# Patient Record
Sex: FEMALE | Race: BLACK OR AFRICAN AMERICAN | NOT HISPANIC OR LATINO | URBAN - METROPOLITAN AREA
[De-identification: names, ages, dates, MRNs, and addresses within clinical notes are randomized per-mention and may not be internally consistent; named-entity substitution may affect disease eponyms.]

---

## 2017-04-07 NOTE — PATIENT DISCUSSION
(Q68.2089) Type 2 diab with mild nonp rtnop without mclr edema l eye - Assesment : Patient has Type 2 Diabetes with mild NPDR Patient states A1C and BS is under control - Plan : Continue to follow with PCP and monitor BS

## 2017-04-07 NOTE — PATIENT DISCUSSION
(E11.9) Type 2 diabetes mellitus without complications - Assesment : Patient has type II diabetes with no ocular complications. - Plan : Continue care with PCP. Will send letter to PCP with today's findings. Discussed with patient Blood Sugar control and A1C under 6. 5.

## 2017-04-07 NOTE — PATIENT DISCUSSION
(E00.391) Dermatochalasis of right upper eyelid - Assesment : Examination revealed Dermatochalasis - Plan : Monitor for changes. Advised patient to call our office with decreased vision or increased symptoms.

## 2017-04-07 NOTE — PATIENT DISCUSSION
(Q85.3415) Nexdtve age-related mclr degn bilateral intermed dry stage - Assesment : Examination revealed AMD Dry. - Plan : Monitor for changes. Advised patient to call our office with decreased vision or increased distortion.

## 2017-04-07 NOTE — PATIENT DISCUSSION
(A80.129) Dermatochalasis of left upper eyelid - Assesment : Examination revealed Dermatochalasis - Plan : Monitor for changes. Advised patient to call our office with decreased vision or increased symptoms.

## 2018-05-10 NOTE — PATIENT DISCUSSION
(O82.2587) Nexdtve age-related mclr degn bilateral intermed dry stage - Assesment : Examination revealed intermediate AMD Dry. - Plan : Patient advised to check [employer] regularly (once weekly or more) and use nutraceuticals such as AREDS 2 eye vitamins. Wear sunglasses when outdoors and eat green, leafy vegetables to maintain ocular health.

## 2018-05-10 NOTE — PATIENT DISCUSSION
(H33.6738) Type 2 diab with mild nonp rtnop without mclr edema l eye - Assesment : Patient has Type 2 Diabetes with mild NPDR Patient states A1C and BS is under control - Plan : Continue to follow with PCP and monitor BS

## 2018-05-11 NOTE — PATIENT DISCUSSION
(H10.45) Other chronic allergic conjunctivitis - Assesment : Examination revealed Allergic Conjunctivitis. - Plan : Monitor for changes. Advised patient to call our office with decreased vision or an increase in symptoms.    Start Zylet TID OU for 3 days Rtc as scheduled

## 2018-11-15 NOTE — PATIENT DISCUSSION
(G10.3573) Nexdtve age-related mclr degn bilateral intermed dry stage - Assesment : Examination revealed intermediate AMD Dry. - Plan : Patient advised to check [employer] regularly (once weekly or more) and use nutraceuticals such as AREDS 2 eye vitamins. Wear sunglasses when outdoors and eat green, leafy vegetables to maintain ocular health.  6 months Exam-octm

## 2018-11-15 NOTE — PATIENT DISCUSSION
(R00.6123) Type 2 diab with mild nonp rtnop without mclr edema l eye - Assesment : Patient has Type 2 Diabetes with mild NPDR,no edema seen today.  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

## 2019-07-02 NOTE — PATIENT DISCUSSION
(F96.0573) Type 2 diab with mild nonp rtnop without macular edema bi - Assesment : A1C WAS 7.2 PER PT DOT BLOT HEM OS - Plan : OBSERVE

## 2019-07-02 NOTE — PATIENT DISCUSSION
(A39.7945) Nexdtve age-related mclr degn bilateral intermed dry stage - Assesment : Examination revealed intermediate AMD Dry.- INTERMEDIATE - Plan : Patient advised to check [address] regularly (once weekly or more) and use nutraceuticals such as AREDS 2 eye vitamins.  Wear sunglasses when outdoors and eat green, leafy vegetables to maintain ocular health. 6 MONTH/ DILATION Daravelino Dena PHOTOS

## 2022-02-21 ENCOUNTER — NEW PATIENT (OUTPATIENT)
Dept: URBAN - METROPOLITAN AREA CLINIC 1 | Facility: CLINIC | Age: 67
End: 2022-02-21

## 2022-02-21 DIAGNOSIS — H25.813: ICD-10-CM

## 2022-02-21 DIAGNOSIS — H04.123: ICD-10-CM

## 2022-02-21 DIAGNOSIS — H01.024: ICD-10-CM

## 2022-02-21 DIAGNOSIS — H01.021: ICD-10-CM

## 2022-02-21 PROCEDURE — 92004 COMPRE OPH EXAM NEW PT 1/>: CPT

## 2022-02-21 PROCEDURE — 92015 DETERMINE REFRACTIVE STATE: CPT

## 2022-02-21 ASSESSMENT — VISUAL ACUITY
OS_CC: 20/25
OD_CC: 20/40
OD_PAM: 20/40
OS_CC: J6
OD_CC: J6
OS_AM: 20/30

## 2024-03-14 ENCOUNTER — APPOINTMENT (OUTPATIENT)
Facility: HOSPITAL | Age: 69
End: 2024-03-14
Payer: MEDICARE

## 2024-03-14 ENCOUNTER — APPOINTMENT (OUTPATIENT)
Facility: HOSPITAL | Age: 69
End: 2024-03-14
Attending: STUDENT IN AN ORGANIZED HEALTH CARE EDUCATION/TRAINING PROGRAM
Payer: MEDICARE

## 2024-03-14 ENCOUNTER — HOSPITAL ENCOUNTER (EMERGENCY)
Facility: HOSPITAL | Age: 69
Discharge: HOME | End: 2024-03-14
Attending: STUDENT IN AN ORGANIZED HEALTH CARE EDUCATION/TRAINING PROGRAM
Payer: MEDICARE

## 2024-03-14 VITALS
BODY MASS INDEX: 39.99 KG/M2 | OXYGEN SATURATION: 99 % | HEART RATE: 56 BPM | RESPIRATION RATE: 18 BRPM | WEIGHT: 270 LBS | HEIGHT: 69 IN | SYSTOLIC BLOOD PRESSURE: 169 MMHG | TEMPERATURE: 98.2 F | DIASTOLIC BLOOD PRESSURE: 77 MMHG

## 2024-03-14 DIAGNOSIS — L03.115 CELLULITIS OF RIGHT LOWER EXTREMITY: ICD-10-CM

## 2024-03-14 DIAGNOSIS — R60.0 LEG EDEMA: Primary | ICD-10-CM

## 2024-03-14 LAB
ALBUMIN SERPL-MCNC: 3.5 G/DL (ref 3.4–5)
ALBUMIN/GLOB SERPL: 0.7 (ref 0.8–1.7)
ALP SERPL-CCNC: 101 U/L (ref 45–117)
ALT SERPL-CCNC: 25 U/L (ref 13–56)
ANION GAP SERPL CALC-SCNC: 6 MMOL/L (ref 3–18)
AST SERPL-CCNC: 21 U/L (ref 10–38)
BASOPHILS # BLD: 0.1 K/UL (ref 0–0.1)
BASOPHILS NFR BLD: 1 % (ref 0–2)
BILIRUB SERPL-MCNC: 0.6 MG/DL (ref 0.2–1)
BUN SERPL-MCNC: 20 MG/DL (ref 7–18)
BUN/CREAT SERPL: 18 (ref 12–20)
CALCIUM SERPL-MCNC: 9.3 MG/DL (ref 8.5–10.1)
CHLORIDE SERPL-SCNC: 107 MMOL/L (ref 100–111)
CO2 SERPL-SCNC: 28 MMOL/L (ref 21–32)
CREAT SERPL-MCNC: 1.11 MG/DL (ref 0.6–1.3)
D DIMER PPP FEU-MCNC: 1.24 UG/ML(FEU)
DIFFERENTIAL METHOD BLD: ABNORMAL
EOSINOPHIL # BLD: 0.5 K/UL (ref 0–0.4)
EOSINOPHIL NFR BLD: 9 % (ref 0–5)
ERYTHROCYTE [DISTWIDTH] IN BLOOD BY AUTOMATED COUNT: 14.4 % (ref 11.6–14.5)
GLOBULIN SER CALC-MCNC: 4.8 G/DL (ref 2–4)
GLUCOSE SERPL-MCNC: 108 MG/DL (ref 74–99)
HCT VFR BLD AUTO: 38.2 % (ref 35–45)
HGB BLD-MCNC: 12.2 G/DL (ref 12–16)
IMM GRANULOCYTES # BLD AUTO: 0 K/UL (ref 0–0.04)
IMM GRANULOCYTES NFR BLD AUTO: 0 % (ref 0–0.5)
INR PPP: 1.1 (ref 0.9–1.1)
LYMPHOCYTES # BLD: 2 K/UL (ref 0.9–3.6)
LYMPHOCYTES NFR BLD: 34 % (ref 21–52)
MAGNESIUM SERPL-MCNC: 1.9 MG/DL (ref 1.6–2.6)
MCH RBC QN AUTO: 29 PG (ref 24–34)
MCHC RBC AUTO-ENTMCNC: 31.9 G/DL (ref 31–37)
MCV RBC AUTO: 91 FL (ref 78–100)
MONOCYTES # BLD: 0.6 K/UL (ref 0.05–1.2)
MONOCYTES NFR BLD: 11 % (ref 3–10)
NEUTS SEG # BLD: 2.7 K/UL (ref 1.8–8)
NEUTS SEG NFR BLD: 45 % (ref 40–73)
NRBC # BLD: 0 K/UL (ref 0–0.01)
NRBC BLD-RTO: 0 PER 100 WBC
NT PRO BNP: 136 PG/ML (ref 0–900)
PLATELET # BLD AUTO: 256 K/UL (ref 135–420)
PMV BLD AUTO: 9.9 FL (ref 9.2–11.8)
POTASSIUM SERPL-SCNC: 3.8 MMOL/L (ref 3.5–5.5)
PROT SERPL-MCNC: 8.3 G/DL (ref 6.4–8.2)
PROTHROMBIN TIME: 14 SEC (ref 11.9–14.7)
RBC # BLD AUTO: 4.2 M/UL (ref 4.2–5.3)
SODIUM SERPL-SCNC: 141 MMOL/L (ref 136–145)
TROPONIN I SERPL HS-MCNC: 8 NG/L (ref 0–54)
WBC # BLD AUTO: 6 K/UL (ref 4.6–13.2)

## 2024-03-14 PROCEDURE — 71045 X-RAY EXAM CHEST 1 VIEW: CPT

## 2024-03-14 PROCEDURE — 85379 FIBRIN DEGRADATION QUANT: CPT

## 2024-03-14 PROCEDURE — 85610 PROTHROMBIN TIME: CPT

## 2024-03-14 PROCEDURE — 93005 ELECTROCARDIOGRAM TRACING: CPT | Performed by: STUDENT IN AN ORGANIZED HEALTH CARE EDUCATION/TRAINING PROGRAM

## 2024-03-14 PROCEDURE — 80053 COMPREHEN METABOLIC PANEL: CPT

## 2024-03-14 PROCEDURE — 85025 COMPLETE CBC W/AUTO DIFF WBC: CPT

## 2024-03-14 PROCEDURE — 6370000000 HC RX 637 (ALT 250 FOR IP): Performed by: STUDENT IN AN ORGANIZED HEALTH CARE EDUCATION/TRAINING PROGRAM

## 2024-03-14 PROCEDURE — 99285 EMERGENCY DEPT VISIT HI MDM: CPT

## 2024-03-14 PROCEDURE — 83735 ASSAY OF MAGNESIUM: CPT

## 2024-03-14 PROCEDURE — 83880 ASSAY OF NATRIURETIC PEPTIDE: CPT

## 2024-03-14 PROCEDURE — 93971 EXTREMITY STUDY: CPT

## 2024-03-14 PROCEDURE — 84484 ASSAY OF TROPONIN QUANT: CPT

## 2024-03-14 RX ORDER — CEPHALEXIN 500 MG/1
500 CAPSULE ORAL 4 TIMES DAILY
Qty: 28 CAPSULE | Refills: 0 | Status: SHIPPED | OUTPATIENT
Start: 2024-03-14 | End: 2024-03-21

## 2024-03-14 RX ORDER — CEPHALEXIN 500 MG/1
500 CAPSULE ORAL 4 TIMES DAILY
Qty: 28 CAPSULE | Refills: 0 | Status: SHIPPED | OUTPATIENT
Start: 2024-03-14 | End: 2024-03-14

## 2024-03-14 RX ORDER — CEPHALEXIN 250 MG/1
500 CAPSULE ORAL
Status: COMPLETED | OUTPATIENT
Start: 2024-03-14 | End: 2024-03-14

## 2024-03-14 RX ADMIN — CEPHALEXIN 500 MG: 250 CAPSULE ORAL at 19:13

## 2024-03-14 ASSESSMENT — PAIN - FUNCTIONAL ASSESSMENT: PAIN_FUNCTIONAL_ASSESSMENT: NONE - DENIES PAIN

## 2024-03-14 NOTE — DISCHARGE INSTRUCTIONS
Please give Keflex medication 4 times daily for the next 7 days to treat the possible cellulitis of her right lower extremity.

## 2024-03-14 NOTE — ED PROVIDER NOTES
[facility] EMERGENCY DEPT  EMERGENCY DEPARTMENT ENCOUNTER      Pt Name: Toya Durand  MRN: [id number]  Birthdate 1955  Date of evaluation: 3/14/2024  Provider: Sheridan Porter MD    CHIEF COMPLAINT       Chief Complaint   Patient presents with    Leg Swelling         HISTORY OF PRESENT ILLNESS   (Location/Symptom, Timing/Onset, Context/Setting, Quality, Duration, Modifying Factors, Severity)  Note limiting factors.   Toya Durand is a 69 y.o. female who presents to the emergency department with her power of  for concerns for worsening edema to her lower extremities worse on the right compared to the left.  Denies any known history of blood clots.  States she does sit with her legs down throughout the day.  Patient is advanced dementia and is a poor historian but denies any current chest pain, shortness of breath or pain in her lower extremities.  Caregiver states that she has recently been complaining of pain in her legs and is painful to touch.  Is not currently on any blood thinners or water pills.     Nursing Notes were reviewed.    REVIEW OF SYSTEMS    (2-9 systems for level 4, 10 or more for level 5)     Respiratory: No SOB  Cardio: No chest pain  GI: No blood in stool  : No hematuria  Neuro: No headache    Except as noted above the remainder of the review of systems was reviewed and negative.       PAST MEDICAL HISTORY   No past medical history on file.      SURGICAL HISTORY     No past surgical history on file.      CURRENT MEDICATIONS       Previous Medications    No medications on file       ALLERGIES     Patient has no known allergies.    FAMILY HISTORY     No family history on file.       SOCIAL HISTORY       Social History     Socioeconomic History    Marital status: Single       SCREENINGS         [place] Coma Scale  Eye Opening: Spontaneous  Best Verbal Response: Oriented  Best Motor Response: Obeys commands  [place] Coma Scale Score: 15                     CIWA Assessment  BP: (!)

## 2024-03-14 NOTE — ED TRIAGE NOTES
Ambulatory to 4 with c/o RLE edema and pain x4 days. Brought by POA from assisted living facility. Pt with Hx of Dementia and poor Historian but is alert to person, denies CP or SOB and caregiver denies c/o either at home.

## 2024-03-15 LAB
EKG ATRIAL RATE: 59 BPM
EKG DIAGNOSIS: NORMAL
EKG P AXIS: 48 DEGREES
EKG P-R INTERVAL: 192 MS
EKG Q-T INTERVAL: 432 MS
EKG QRS DURATION: 90 MS
EKG QTC CALCULATION (BAZETT): 427 MS
EKG R AXIS: 6 DEGREES
EKG T AXIS: 27 DEGREES
EKG VENTRICULAR RATE: 59 BPM

## 2024-03-15 PROCEDURE — 93010 ELECTROCARDIOGRAM REPORT: CPT | Performed by: INTERNAL MEDICINE

## 2024-03-17 LAB — ECHO BSA: 2.44 M2

## 2024-11-12 ENCOUNTER — APPOINTMENT (OUTPATIENT)
Facility: HOSPITAL | Age: 69
End: 2024-11-12
Payer: MEDICARE

## 2024-11-12 ENCOUNTER — APPOINTMENT (OUTPATIENT)
Facility: HOSPITAL | Age: 69
End: 2024-11-12
Attending: EMERGENCY MEDICINE
Payer: MEDICARE

## 2024-11-12 ENCOUNTER — HOSPITAL ENCOUNTER (INPATIENT)
Facility: HOSPITAL | Age: 69
LOS: 3 days | Discharge: NURSING, REHAB OR LONG-TERM CARE | End: 2024-11-15
Attending: EMERGENCY MEDICINE | Admitting: STUDENT IN AN ORGANIZED HEALTH CARE EDUCATION/TRAINING PROGRAM
Payer: MEDICARE

## 2024-11-12 DIAGNOSIS — R00.1 BRADYCARDIA: ICD-10-CM

## 2024-11-12 DIAGNOSIS — R60.0 BILATERAL LOWER EXTREMITY EDEMA: ICD-10-CM

## 2024-11-12 DIAGNOSIS — I50.31 ACUTE DIASTOLIC CONGESTIVE HEART FAILURE (HCC): ICD-10-CM

## 2024-11-12 DIAGNOSIS — F03.A0 MILD DEMENTIA WITHOUT BEHAVIORAL DISTURBANCE, PSYCHOTIC DISTURBANCE, MOOD DISTURBANCE, OR ANXIETY, UNSPECIFIED DEMENTIA TYPE (HCC): Primary | ICD-10-CM

## 2024-11-12 PROBLEM — R41.82 ALTERED MENTAL STATUS: Status: ACTIVE | Noted: 2024-11-12

## 2024-11-12 LAB
ALBUMIN SERPL-MCNC: 3.6 G/DL (ref 3.4–5)
ALBUMIN/GLOB SERPL: 0.8 (ref 0.8–1.7)
ALP SERPL-CCNC: 87 U/L (ref 45–117)
ALT SERPL-CCNC: 40 U/L (ref 13–56)
ANION GAP SERPL CALC-SCNC: 7 MMOL/L (ref 3–18)
AST SERPL-CCNC: 107 U/L (ref 10–38)
BASE EXCESS BLDV CALC-SCNC: 4.5 MMOL/L
BASOPHILS # BLD: 0.1 K/UL (ref 0–0.1)
BASOPHILS NFR BLD: 1 % (ref 0–2)
BILIRUB SERPL-MCNC: 0.8 MG/DL (ref 0.2–1)
BUN SERPL-MCNC: 32 MG/DL (ref 7–18)
BUN/CREAT SERPL: 25 (ref 12–20)
CALCIUM SERPL-MCNC: 9.2 MG/DL (ref 8.5–10.1)
CHLORIDE SERPL-SCNC: 104 MMOL/L (ref 100–111)
CO2 SERPL-SCNC: 30 MMOL/L (ref 21–32)
CREAT SERPL-MCNC: 1.28 MG/DL (ref 0.6–1.3)
DIFFERENTIAL METHOD BLD: ABNORMAL
EOSINOPHIL # BLD: 0.3 K/UL (ref 0–0.4)
EOSINOPHIL NFR BLD: 5 % (ref 0–5)
ERYTHROCYTE [DISTWIDTH] IN BLOOD BY AUTOMATED COUNT: 14.2 % (ref 11.6–14.5)
GLOBULIN SER CALC-MCNC: 4.6 G/DL (ref 2–4)
GLUCOSE SERPL-MCNC: 87 MG/DL (ref 74–99)
HCO3 BLDV-SCNC: 31.7 MMOL/L (ref 23–28)
HCT VFR BLD AUTO: 37.2 % (ref 35–45)
HGB BLD-MCNC: 11.8 G/DL (ref 12–16)
IMM GRANULOCYTES # BLD AUTO: 0 K/UL (ref 0–0.04)
IMM GRANULOCYTES NFR BLD AUTO: 0 % (ref 0–0.5)
LYMPHOCYTES # BLD: 1.7 K/UL (ref 0.9–3.6)
LYMPHOCYTES NFR BLD: 28 % (ref 21–52)
MAGNESIUM SERPL-MCNC: 2.1 MG/DL (ref 1.6–2.6)
MCH RBC QN AUTO: 29.6 PG (ref 24–34)
MCHC RBC AUTO-ENTMCNC: 31.7 G/DL (ref 31–37)
MCV RBC AUTO: 93.2 FL (ref 78–100)
MONOCYTES # BLD: 0.6 K/UL (ref 0.05–1.2)
MONOCYTES NFR BLD: 9 % (ref 3–10)
NEUTS SEG # BLD: 3.4 K/UL (ref 1.8–8)
NEUTS SEG NFR BLD: 56 % (ref 40–73)
NRBC # BLD: 0 K/UL (ref 0–0.01)
NRBC BLD-RTO: 0 PER 100 WBC
NT PRO BNP: 536 PG/ML (ref 0–900)
PCO2 BLDV: 56.2 MMHG (ref 41–51)
PH BLDV: 7.36 (ref 7.32–7.42)
PLATELET # BLD AUTO: 200 K/UL (ref 135–420)
PMV BLD AUTO: 9.7 FL (ref 9.2–11.8)
PO2 BLDV: 25 MMHG (ref 25–40)
POTASSIUM SERPL-SCNC: 3 MMOL/L (ref 3.5–5.5)
PROT SERPL-MCNC: 8.2 G/DL (ref 6.4–8.2)
RBC # BLD AUTO: 3.99 M/UL (ref 4.2–5.3)
SAO2 % BLDV: 41.4 % (ref 65–88)
SERVICE CMNT-IMP: ABNORMAL
SODIUM SERPL-SCNC: 141 MMOL/L (ref 136–145)
SPECIMEN TYPE: ABNORMAL
TROPONIN I SERPL HS-MCNC: 21 NG/L (ref 0–54)
TSH SERPL DL<=0.05 MIU/L-ACNC: 0.82 UIU/ML (ref 0.36–3.74)
WBC # BLD AUTO: 6.1 K/UL (ref 4.6–13.2)

## 2024-11-12 PROCEDURE — 1100000000 HC RM PRIVATE

## 2024-11-12 PROCEDURE — 83735 ASSAY OF MAGNESIUM: CPT

## 2024-11-12 PROCEDURE — 85025 COMPLETE CBC W/AUTO DIFF WBC: CPT

## 2024-11-12 PROCEDURE — 93005 ELECTROCARDIOGRAM TRACING: CPT | Performed by: EMERGENCY MEDICINE

## 2024-11-12 PROCEDURE — 84443 ASSAY THYROID STIM HORMONE: CPT

## 2024-11-12 PROCEDURE — 80053 COMPREHEN METABOLIC PANEL: CPT

## 2024-11-12 PROCEDURE — 82140 ASSAY OF AMMONIA: CPT

## 2024-11-12 PROCEDURE — 83880 ASSAY OF NATRIURETIC PEPTIDE: CPT

## 2024-11-12 PROCEDURE — 84484 ASSAY OF TROPONIN QUANT: CPT

## 2024-11-12 PROCEDURE — 6370000000 HC RX 637 (ALT 250 FOR IP): Performed by: EMERGENCY MEDICINE

## 2024-11-12 PROCEDURE — 70450 CT HEAD/BRAIN W/O DYE: CPT

## 2024-11-12 PROCEDURE — 71045 X-RAY EXAM CHEST 1 VIEW: CPT

## 2024-11-12 PROCEDURE — 82803 BLOOD GASES ANY COMBINATION: CPT

## 2024-11-12 PROCEDURE — 99285 EMERGENCY DEPT VISIT HI MDM: CPT

## 2024-11-12 RX ORDER — DONEPEZIL HYDROCHLORIDE 10 MG/1
10 TABLET, FILM COATED ORAL NIGHTLY
COMMUNITY

## 2024-11-12 RX ORDER — GABAPENTIN 100 MG/1
100 CAPSULE ORAL 2 TIMES DAILY
COMMUNITY
End: 2024-11-18

## 2024-11-12 RX ORDER — MEMANTINE HYDROCHLORIDE 10 MG/1
10 TABLET ORAL 2 TIMES DAILY
COMMUNITY

## 2024-11-12 RX ORDER — ATENOLOL 50 MG/1
50 TABLET ORAL DAILY
COMMUNITY

## 2024-11-12 RX ORDER — FLUTICASONE PROPIONATE AND SALMETEROL XINAFOATE 45; 21 UG/1; UG/1
2 AEROSOL, METERED RESPIRATORY (INHALATION) 2 TIMES DAILY
COMMUNITY

## 2024-11-12 RX ORDER — POTASSIUM CHLORIDE 1.5 G/1.58G
20 POWDER, FOR SOLUTION ORAL DAILY
COMMUNITY

## 2024-11-12 RX ORDER — HYDROCHLOROTHIAZIDE 25 MG/1
25 TABLET ORAL DAILY
COMMUNITY

## 2024-11-12 RX ORDER — QUETIAPINE FUMARATE 50 MG/1
50 TABLET, EXTENDED RELEASE ORAL EVERY OTHER DAY
COMMUNITY

## 2024-11-12 RX ORDER — PYRIDOXINE HCL (VITAMIN B6) 100 MG
500 TABLET ORAL 2 TIMES DAILY
COMMUNITY

## 2024-11-12 RX ORDER — ATORVASTATIN CALCIUM 10 MG/1
10 TABLET, FILM COATED ORAL DAILY
COMMUNITY

## 2024-11-12 RX ORDER — LOSARTAN POTASSIUM 100 MG/1
100 TABLET ORAL DAILY
COMMUNITY

## 2024-11-12 RX ADMIN — POTASSIUM BICARBONATE 40 MEQ: 782 TABLET, EFFERVESCENT ORAL at 21:59

## 2024-11-12 ASSESSMENT — PAIN - FUNCTIONAL ASSESSMENT: PAIN_FUNCTIONAL_ASSESSMENT: NONE - DENIES PAIN

## 2024-11-12 ASSESSMENT — LIFESTYLE VARIABLES
HOW OFTEN DO YOU HAVE A DRINK CONTAINING ALCOHOL: NEVER
HOW MANY STANDARD DRINKS CONTAINING ALCOHOL DO YOU HAVE ON A TYPICAL DAY: PATIENT DOES NOT DRINK

## 2024-11-12 NOTE — ED PROVIDER NOTES
[facility] EMERGENCY DEPT  EMERGENCY DEPARTMENT ENCOUNTER    Patient Name: Toya Durand  MRN: [id number]  YOB: 1955  Provider: Tres Junior DO  PCP: Estephania Greene MD   Time/Date of evaluation: 6:24 PM EST on 11/12/24    History of Presenting Illness     History Provided by: Patient, Caretaker  History is limited by: Dementia     HISTORY:   Toya Durand is a 69 y.o. female brought in by her daughter who is power of  with a chief complaint of increasing fatigue, and lower extremity edema.  Patient has a history of chronic venous stasis dermatitis, hypertension, UTI, dementia, organic psychosis, and hyperlipidemia.  Daughter states that the nursing facility at [facility] reports that the patient has had increasing fatigue and sleeping more over the past few days.  Patient's daughter states that they decreased her Seroquel dose last week because she was sleeping too much.  Patient completed a antibiotic course of cephalexin 2 days ago which was prescribed for a urinary tract infection.  She states that the patient has had decreased appetite but ate a meal yesterday which was her favorite food Popeyes chicken.  Nursing Notes were all reviewed and agreed with or any disagreements were addressed in the HPI.    Past History     PAST MEDICAL HISTORY:  No past medical history on file.    PAST SURGICAL HISTORY:  No past surgical history on file.    FAMILY HISTORY:  No family history on file.    SOCIAL HISTORY:       MEDICATIONS:  No current facility-administered medications for this encounter.     Current Outpatient Medications   Medication Sig Dispense Refill    fluticasone-salmeterol (ADVAIR HFA) 45-21 MCG/ACT inhaler Inhale 2 puffs into the lungs 2 times daily      atenolol (TENORMIN) 50 MG tablet Take 1 tablet by mouth daily      atorvastatin (LIPITOR) 10 MG tablet Take 1 tablet by mouth daily      Cranberry 500 MG CAPS Take 1 capsule by mouth 2 times daily      donepezil (ARICEPT) 10 MG

## 2024-11-12 NOTE — ED TRIAGE NOTES
Pt recently finished antibiotics for UTI. She has been fatigued and not acting as energetic as normal the last 2 days. Her Seroquel was recently decreased to half a piull due to sleeping more. She has had decreased appetite 2 days. Family also reports increased RLE edema which she gets chronically and is on Lasix PRN for. She started it again yesterday with some improvement.

## 2024-11-13 ENCOUNTER — APPOINTMENT (OUTPATIENT)
Facility: HOSPITAL | Age: 69
End: 2024-11-13
Payer: MEDICARE

## 2024-11-13 PROBLEM — I10 HYPERTENSION: Status: ACTIVE | Noted: 2024-11-13

## 2024-11-13 PROBLEM — F03.90 DEMENTIA (HCC): Status: ACTIVE | Noted: 2024-11-13

## 2024-11-13 PROBLEM — E87.6 HYPOKALEMIA: Status: ACTIVE | Noted: 2024-11-13

## 2024-11-13 LAB
AMMONIA PLAS-SCNC: 27 UMOL/L (ref 11–32)
APPEARANCE UR: CLEAR
BILIRUB UR QL: NEGATIVE
COLOR UR: YELLOW
EKG DIAGNOSIS: NORMAL
EKG Q-T INTERVAL: 468 MS
EKG QRS DURATION: 76 MS
EKG QTC CALCULATION (BAZETT): 409 MS
EKG R AXIS: 12 DEGREES
EKG T AXIS: 22 DEGREES
EKG VENTRICULAR RATE: 46 BPM
GLUCOSE UR STRIP.AUTO-MCNC: NEGATIVE MG/DL
HGB UR QL STRIP: NEGATIVE
KETONES UR QL STRIP.AUTO: NEGATIVE MG/DL
LEUKOCYTE ESTERASE UR QL STRIP.AUTO: NEGATIVE
NITRITE UR QL STRIP.AUTO: NEGATIVE
PH UR STRIP: 5.5 (ref 5–8)
PROT UR STRIP-MCNC: NEGATIVE MG/DL
SP GR UR REFRACTOMETRY: 1.02 (ref 1–1.03)
UROBILINOGEN UR QL STRIP.AUTO: 1 EU/DL (ref 0.2–1)

## 2024-11-13 PROCEDURE — 2580000003 HC RX 258: Performed by: STUDENT IN AN ORGANIZED HEALTH CARE EDUCATION/TRAINING PROGRAM

## 2024-11-13 PROCEDURE — 6360000002 HC RX W HCPCS: Performed by: STUDENT IN AN ORGANIZED HEALTH CARE EDUCATION/TRAINING PROGRAM

## 2024-11-13 PROCEDURE — 51701 INSERT BLADDER CATHETER: CPT

## 2024-11-13 PROCEDURE — 1100000003 HC PRIVATE W/ TELEMETRY

## 2024-11-13 PROCEDURE — 87086 URINE CULTURE/COLONY COUNT: CPT

## 2024-11-13 PROCEDURE — 94640 AIRWAY INHALATION TREATMENT: CPT

## 2024-11-13 PROCEDURE — 6370000000 HC RX 637 (ALT 250 FOR IP): Performed by: STUDENT IN AN ORGANIZED HEALTH CARE EDUCATION/TRAINING PROGRAM

## 2024-11-13 PROCEDURE — 70551 MRI BRAIN STEM W/O DYE: CPT

## 2024-11-13 PROCEDURE — 81003 URINALYSIS AUTO W/O SCOPE: CPT

## 2024-11-13 PROCEDURE — 93010 ELECTROCARDIOGRAM REPORT: CPT | Performed by: INTERNAL MEDICINE

## 2024-11-13 PROCEDURE — 99223 1ST HOSP IP/OBS HIGH 75: CPT | Performed by: STUDENT IN AN ORGANIZED HEALTH CARE EDUCATION/TRAINING PROGRAM

## 2024-11-13 PROCEDURE — 51798 US URINE CAPACITY MEASURE: CPT

## 2024-11-13 PROCEDURE — 94761 N-INVAS EAR/PLS OXIMETRY MLT: CPT

## 2024-11-13 PROCEDURE — 6370000000 HC RX 637 (ALT 250 FOR IP): Performed by: EMERGENCY MEDICINE

## 2024-11-13 RX ORDER — MEMANTINE HYDROCHLORIDE 10 MG/1
10 TABLET ORAL 2 TIMES DAILY
Status: DISCONTINUED | OUTPATIENT
Start: 2024-11-13 | End: 2024-11-15 | Stop reason: HOSPADM

## 2024-11-13 RX ORDER — ATENOLOL 50 MG/1
50 TABLET ORAL DAILY
Status: DISCONTINUED | OUTPATIENT
Start: 2024-11-13 | End: 2024-11-15 | Stop reason: HOSPADM

## 2024-11-13 RX ORDER — ACETAMINOPHEN 325 MG/1
650 TABLET ORAL EVERY 6 HOURS PRN
Status: DISCONTINUED | OUTPATIENT
Start: 2024-11-13 | End: 2024-11-15 | Stop reason: HOSPADM

## 2024-11-13 RX ORDER — GABAPENTIN 100 MG/1
100 CAPSULE ORAL 2 TIMES DAILY
Status: DISCONTINUED | OUTPATIENT
Start: 2024-11-13 | End: 2024-11-15 | Stop reason: HOSPADM

## 2024-11-13 RX ORDER — ENOXAPARIN SODIUM 100 MG/ML
30 INJECTION SUBCUTANEOUS 2 TIMES DAILY
Status: DISCONTINUED | OUTPATIENT
Start: 2024-11-14 | End: 2024-11-15 | Stop reason: HOSPADM

## 2024-11-13 RX ORDER — ONDANSETRON 2 MG/ML
4 INJECTION INTRAMUSCULAR; INTRAVENOUS EVERY 6 HOURS PRN
Status: DISCONTINUED | OUTPATIENT
Start: 2024-11-13 | End: 2024-11-15 | Stop reason: HOSPADM

## 2024-11-13 RX ORDER — LORAZEPAM 2 MG/ML
1 INJECTION INTRAMUSCULAR ONCE
Status: COMPLETED | OUTPATIENT
Start: 2024-11-13 | End: 2024-11-13

## 2024-11-13 RX ORDER — SODIUM CHLORIDE 9 MG/ML
INJECTION, SOLUTION INTRAVENOUS PRN
Status: DISCONTINUED | OUTPATIENT
Start: 2024-11-13 | End: 2024-11-15 | Stop reason: HOSPADM

## 2024-11-13 RX ORDER — MAGNESIUM SULFATE IN WATER 40 MG/ML
2000 INJECTION, SOLUTION INTRAVENOUS PRN
Status: DISCONTINUED | OUTPATIENT
Start: 2024-11-13 | End: 2024-11-15 | Stop reason: HOSPADM

## 2024-11-13 RX ORDER — SODIUM CHLORIDE 0.9 % (FLUSH) 0.9 %
5-40 SYRINGE (ML) INJECTION EVERY 12 HOURS SCHEDULED
Status: DISCONTINUED | OUTPATIENT
Start: 2024-11-13 | End: 2024-11-15 | Stop reason: HOSPADM

## 2024-11-13 RX ORDER — ONDANSETRON 4 MG/1
4 TABLET, ORALLY DISINTEGRATING ORAL EVERY 8 HOURS PRN
Status: DISCONTINUED | OUTPATIENT
Start: 2024-11-13 | End: 2024-11-15 | Stop reason: HOSPADM

## 2024-11-13 RX ORDER — HALOPERIDOL 5 MG/ML
5 INJECTION INTRAMUSCULAR EVERY 6 HOURS PRN
Status: DISCONTINUED | OUTPATIENT
Start: 2024-11-13 | End: 2024-11-15 | Stop reason: HOSPADM

## 2024-11-13 RX ORDER — POLYETHYLENE GLYCOL 3350 17 G/17G
17 POWDER, FOR SOLUTION ORAL DAILY PRN
Status: DISCONTINUED | OUTPATIENT
Start: 2024-11-13 | End: 2024-11-15 | Stop reason: HOSPADM

## 2024-11-13 RX ORDER — SODIUM CHLORIDE 0.9 % (FLUSH) 0.9 %
5-40 SYRINGE (ML) INJECTION PRN
Status: DISCONTINUED | OUTPATIENT
Start: 2024-11-13 | End: 2024-11-15 | Stop reason: HOSPADM

## 2024-11-13 RX ORDER — ACETAMINOPHEN 650 MG/1
650 SUPPOSITORY RECTAL EVERY 6 HOURS PRN
Status: DISCONTINUED | OUTPATIENT
Start: 2024-11-13 | End: 2024-11-15 | Stop reason: HOSPADM

## 2024-11-13 RX ORDER — ENOXAPARIN SODIUM 100 MG/ML
40 INJECTION SUBCUTANEOUS DAILY
Status: DISCONTINUED | OUTPATIENT
Start: 2024-11-13 | End: 2024-11-13

## 2024-11-13 RX ORDER — DONEPEZIL HYDROCHLORIDE 5 MG/1
10 TABLET, FILM COATED ORAL NIGHTLY
Status: DISCONTINUED | OUTPATIENT
Start: 2024-11-13 | End: 2024-11-15 | Stop reason: HOSPADM

## 2024-11-13 RX ORDER — POTASSIUM CHLORIDE 1500 MG/1
40 TABLET, EXTENDED RELEASE ORAL PRN
Status: DISCONTINUED | OUTPATIENT
Start: 2024-11-13 | End: 2024-11-15 | Stop reason: HOSPADM

## 2024-11-13 RX ORDER — ATORVASTATIN CALCIUM 10 MG/1
10 TABLET, FILM COATED ORAL DAILY
Status: DISCONTINUED | OUTPATIENT
Start: 2024-11-13 | End: 2024-11-15 | Stop reason: HOSPADM

## 2024-11-13 RX ORDER — POTASSIUM CHLORIDE 7.45 MG/ML
10 INJECTION INTRAVENOUS PRN
Status: DISCONTINUED | OUTPATIENT
Start: 2024-11-13 | End: 2024-11-15 | Stop reason: HOSPADM

## 2024-11-13 RX ADMIN — HALOPERIDOL LACTATE 5 MG: 5 INJECTION, SOLUTION INTRAMUSCULAR at 13:42

## 2024-11-13 RX ADMIN — HALOPERIDOL LACTATE 5 MG: 5 INJECTION, SOLUTION INTRAMUSCULAR at 04:52

## 2024-11-13 RX ADMIN — LORAZEPAM 1 MG: 2 INJECTION INTRAMUSCULAR; INTRAVENOUS at 14:14

## 2024-11-13 RX ADMIN — ARFORMOTEROL TARTRATE: 15 SOLUTION RESPIRATORY (INHALATION) at 19:56

## 2024-11-13 RX ADMIN — ATORVASTATIN CALCIUM 10 MG: 10 TABLET, FILM COATED ORAL at 09:26

## 2024-11-13 RX ADMIN — DONEPEZIL HYDROCHLORIDE 10 MG: 5 TABLET, FILM COATED ORAL at 20:46

## 2024-11-13 RX ADMIN — POTASSIUM BICARBONATE 40 MEQ: 782 TABLET, EFFERVESCENT ORAL at 00:41

## 2024-11-13 RX ADMIN — SODIUM CHLORIDE, PRESERVATIVE FREE 10 ML: 5 INJECTION INTRAVENOUS at 09:27

## 2024-11-13 RX ADMIN — GABAPENTIN 100 MG: 100 CAPSULE ORAL at 20:46

## 2024-11-13 RX ADMIN — SODIUM CHLORIDE, PRESERVATIVE FREE 10 ML: 5 INJECTION INTRAVENOUS at 20:44

## 2024-11-13 RX ADMIN — MEMANTINE 10 MG: 10 TABLET ORAL at 09:26

## 2024-11-13 RX ADMIN — ATENOLOL 50 MG: 50 TABLET ORAL at 09:26

## 2024-11-13 RX ADMIN — MEMANTINE 10 MG: 10 TABLET ORAL at 20:46

## 2024-11-13 RX ADMIN — GABAPENTIN 100 MG: 100 CAPSULE ORAL at 09:26

## 2024-11-13 RX ADMIN — ENOXAPARIN SODIUM 40 MG: 100 INJECTION SUBCUTANEOUS at 09:24

## 2024-11-13 ASSESSMENT — PAIN SCALES - PAIN ASSESSMENT IN ADVANCED DEMENTIA (PAINAD)
CONSOLABILITY: NO NEED TO CONSOLE
TOTALSCORE: 0
CONSOLABILITY: NO NEED TO CONSOLE
FACIALEXPRESSION: SMILING OR INEXPRESSIVE
TOTALSCORE: 0
CONSOLABILITY: NO NEED TO CONSOLE
BREATHING: NORMAL
BODYLANGUAGE: RELAXED
TOTALSCORE: 0
CONSOLABILITY: NO NEED TO CONSOLE
BREATHING: NORMAL
TOTALSCORE: 0
FACIALEXPRESSION: SMILING OR INEXPRESSIVE
BODYLANGUAGE: RELAXED
BODYLANGUAGE: RELAXED
BREATHING: NORMAL
BREATHING: NORMAL
FACIALEXPRESSION: SMILING OR INEXPRESSIVE
BODYLANGUAGE: RELAXED
BODYLANGUAGE: RELAXED
FACIALEXPRESSION: SMILING OR INEXPRESSIVE
CONSOLABILITY: NO NEED TO CONSOLE
FACIALEXPRESSION: SMILING OR INEXPRESSIVE
BREATHING: NORMAL
TOTALSCORE: 0

## 2024-11-13 NOTE — ED NOTES
TRANSFER - OUT REPORT:    Verbal report given to Sharee on Toya Durand  being transferred to [facility] for routine progression of patient care       Report consisted of patient's Situation, Background, Assessment and   Recommendations(SBAR).     Information from the following report(s) Nurse Handoff Report was reviewed with the receiving nurse.    Romelia Fall Assessment:    Presents to emergency department  because of falls (Syncope, seizure, or loss of consciousness): No  Age > 70: No  Altered Mental Status, Intoxication with alcohol or substance confusion (Disorientation, impaired judgment, poor safety awaremess, or inability to follow instructions): Yes  Impaired Mobility: Ambulates or transfers with assistive devices or assistance; Unable to ambulate or transer.: Yes  Nursing Judgement: Yes          Lines:   Peripheral IV 11/12/24 Left Antecubital (Active)        Opportunity for questions and clarification was provided.      Patient transported with:  Monitor

## 2024-11-13 NOTE — PROGRESS NOTES
[facility] MEDICINE  - STUDENT PROGRESS NOTE    Admit Date: 11/12/2024 11/13/2024  HPI limited due to patient being confused  Subjective: Patient seen on rounds this morning. She is laying in bed in no acute distress. Patient aware only to self. When asked about symptoms patient notes shortness of breath/difficulty breathing but cannot specify timeframe.       Objective:   Vitals: BP (!) 181/92   Pulse (!) 108   Temp 98.3 °F (36.8 °C) (Oral)   Resp 18   Ht 1.753 m (5' 9\")   Wt 114.7 kg (252 lb 14.4 oz)   SpO2 99%   BMI 37.35 kg/m²   General appearance: alert, appears stated age and cooperative  Skin: Skin color, texture, turgor normal.   HEENT: Head: Normocephalic, no lesions, without obvious abnormality.  Neck: supple, symmetrical, trachea midline  Lungs:  unable to discern between decreased breath sounds or low patient effort due to difficulty following orders  Heart: regular rate and rhythm, S1, S2 normal, no murmur, click, rub or gallop  Abdomen: soft, non-tender; bowel sounds normal; no masses,  no organomegaly  Extremities: extremities normal, atraumatic, no cyanosis or edema  Lymphatic: No significant lymph node enlargement papable  Neurologic: Mental status: alertness: alert, orientation: person      Data:   Scheduled Meds: Reviewed  Continuous Infusions:   sodium chloride         Intake/Output Summary (Last 24 hours) at 11/13/2024 1120  Last data filed at 11/13/2024 0606  Gross per 24 hour   Intake 120 ml   Output 295 ml   Net -175 ml     CBC:   Recent Labs     11/12/24 1936   WBC 6.1   HGB 11.8*        BMP:  Recent Labs     11/12/24 1936      K 3.0*      CO2 30   BUN 32*   CREATININE 1.28   GLUCOSE 87     ABGs: No results found for: \"PHART\", \"PO2ART\", \"MBG5DLU\"  INR: No results for input(s): \"INR\" in the last 72 hours.  -----------------------------------------------------------------      Assessment/Plan    Patient Active Problem List:      Altered mental status     Dementia (HCC)     Hypertension     Hypokalemia      PLAN  Altered mental status  Ddx: worsening dementia, CVA/TIA, UTI, viral infection, pneumonia, hypothyroidism.     Chart review notes patient has history of dementia and is resident at nursing facility at [facility] who noted patient has been having worsening fatigue. Recently decreased dose of seroquel without improvement. Just completed course of antibiotics for UTI. Decreased appetite. Initially evaluated for stroke in Emergency Room with CT head noting 1.0cm hypodensity in the right basal ganglia but unable to discern if acute or chronic. Pending MRI for further evaluation at this time.   Patient remains afebrile and tachycardic She is hypertensive however displays difficulty with compliance when obtaining BP. Urine analysis negative for acute findings.   Chest Xray noting interstitial markings consistent with hypoventilation changes, but no acute changes no pleural effusion. Venous blood gas analysis notes compensated respiratory acidosis likely secondary to decreased pulmonary effort. Low concern for viral/bacterial pneumonia.   TSH normal.    Further Workup:  Vitamin B12, Folic Acid, ethanol, opioid screening.    Syd Thrasher OMS-III  11:20 AM  11/13/2024      This note is for Medical Student training purposes only, and should not be used as reference. Please see attending physician note for full plan and details.

## 2024-11-13 NOTE — PROGRESS NOTES
Physical Therapy  PT order received and chart reviewed.  Attempt x 2 to see pt for PT evaluation, on first attempt pt sleeping soundly and unable to awaken enough to participate in therapy.  On second attempt RN and CNA present giving pt bed bath.  Will continue to follow pt and see as appropriate/time allows.  Thank you for this referral.      Chrystal Mathews, PT, DPT

## 2024-11-13 NOTE — PLAN OF CARE
Signed.  Problem: Discharge Planning  Goal: Discharge to home or other facility with appropriate resources  Outcome: Progressing  Flowsheets  Taken 11/13/2024 0357  Discharge to home or other facility with appropriate resources:   Identify barriers to discharge with patient and caregiver   Identify discharge learning needs (meds, wound care, etc)  Taken 11/13/2024 0347  Discharge to home or other facility with appropriate resources: Identify barriers to discharge with patient and caregiver     Problem: Confusion  Goal: Confusion, delirium, dementia, or psychosis is improved or at baseline  Description: INTERVENTIONS:  1. Assess for possible contributors to thought disturbance, including medications, impaired vision or hearing, underlying metabolic abnormalities, dehydration, psychiatric diagnoses, and notify attending LIP  2. [place] high risk fall precautions, as indicated  3. Provide frequent short contacts to provide reality reorientation, refocusing and direction  4. Decrease environmental stimuli, including noise as appropriate  5. Monitor and intervene to maintain adequate nutrition, hydration, elimination, sleep and activity  6. If unable to ensure safety without constant attention obtain sitter and review sitter guidelines with assigned personnel  7. Initiate Psychosocial CNS and Spiritual Care consult, as indicated  Outcome: Progressing  Flowsheets (Taken 11/13/2024 0357)  Effect of thought disturbance (confusion, delirium, dementia, or psychosis) are managed with adequate functional status:   Assess for contributors to thought disturbance, including medications, impaired vision or hearing, underlying metabolic abnormalities, dehydration, psychiatric diagnoses, notify LIP   [place] high risk fall precautions, as indicated   Provide frequent short contacts to provide reality reorientation, refocusing and direction   Monitor and intervene to maintain adequate nutrition, hydration, elimination, sleep and

## 2024-11-13 NOTE — H&P
Hospitalist Admission History and Physical    NAME:  Toya Durand   :   1955   MRN:   [id number]     PCP:  Estephania Greene MD  Date/Time:  2024 5:18 AM  Subjective:   CHIEF COMPLAINT:  altered mental status    HISTORY OF PRESENT ILLNESS:     Toya is a 69 y.o.   female with pmh dementia and htn presents with altered mental status. The nursing facility at [facility] reports that the patient has had increasing fatigue and sleeping more over the past few days.  Patient's daughter states that they decreased her Seroquel dose last week because she was sleeping too much.  Patient completed a antibiotic course of cephalexin 2 days ago which was prescribed for a urinary tract infection.  She states that the patient has had decreased appetite but ate a meal yesterday which was her favorite food Popeyes chicken.    Upon my exam, patient is only alert to self. Unable to answer other questions.  CT head showed 1.0 cm hypodensity visualized within the right basal ganglia.         No past medical history on file.     No past surgical history on file.    Social History     Tobacco Use    Smoking status: Never    Smokeless tobacco: Never   Substance Use Topics    Alcohol use: Never        No family history on file.     Allergies   Allergen Reactions    Seasonal         Prior to Admission Medications   Prescriptions Last Dose Informant Patient Reported? Taking?   Cranberry 500 MG CAPS 2024 at 900  Yes Yes   Sig: Take 1 capsule by mouth 2 times daily   QUEtiapine (SEROQUEL XR) 50 MG extended release tablet 2024 at 900  Yes Yes   Sig: Take 1 tablet by mouth every other day Reduced to 1/2 tablet 25 mg total to reduce drowsiness written as 50 mg incase patient needs to go back to 50 mg later per POA   atenolol (TENORMIN) 50 MG tablet 2024 at 900  Yes Yes   Sig: Take 1 tablet by mouth daily   atorvastatin (LIPITOR) 10 MG tablet 2024 at 900  Yes Yes   Sig: Take 1 tablet by  mouth daily   donepezil (ARICEPT) 10 MG tablet 11/12/2024 at 900  Yes Yes   Sig: Take 1 tablet by mouth nightly   fluticasone-salmeterol (ADVAIR HFA) 45-21 MCG/ACT inhaler 11/12/2024 at 900  Yes Yes   Sig: Inhale 2 puffs into the lungs 2 times daily   gabapentin (NEURONTIN) 100 MG capsule 11/12/2024 at 900  Yes Yes   Sig: Take 1 capsule by mouth in the morning and at bedtime.   hydroCHLOROthiazide (HYDRODIURIL) 25 MG tablet 11/12/2024 at 900  Yes Yes   Sig: Take 1 tablet by mouth daily   losartan (COZAAR) 100 MG tablet 11/12/2024 at 900  Yes Yes   Sig: Take 1 tablet by mouth daily   memantine (NAMENDA) 10 MG tablet 11/12/2024 at 900  Yes Yes   Sig: Take 1 tablet by mouth 2 times daily   potassium chloride (KLOR-CON) 20 MEQ packet 11/12/2024 at 900  Yes Yes   Sig: Take 20 mEq by mouth daily      Facility-Administered Medications: None       REVIEW OF SYSTEMS:     [x] Unable to obtain  ROS due to  [x]mental status change  []sedated   []intubated   []Total of 12 systems reviewed as follows:  Constitutional: negative fever, negative chills, negative weight loss  Eyes:   negative visual changes  ENT:   negative sore throat, tongue or lip swelling  Respiratory:  negative cough, negative dyspnea  Cards:   negative for chest pain, palpitations, lower extremity edema  GI:   negative for nausea, vomiting, diarrhea, and abdominal pain  Genitourinary: negative for frequency, dysuria  Integument:  negative for rash and pruritus  Hematologic:  negative for easy bruising and gum/nose bleeding  Musculoskel: negative for myalgias,  back pain and muscle weakness  Neurological:  negative for headaches, dizziness, vertigo  Behavl/Psych: negative for feelings of anxiety, depression     Pertinent Positives include:    Objective:     PHYSICAL EXAM:   Objective:  General Appearance:  Comfortable.    Vital signs: (most recent): Blood pressure (!) 152/70, pulse 61, temperature 98.2 °F (36.8 °C), temperature source Axillary, resp. rate 20,  Keflex  #Hypertension).     Plan:   Regular diet.  Administer medications as ordered.   (    - Admit to tele  - Ordered MRI brain  - UA is negative for infection, but patient recently completed abx  - Pending urine culture  - Resume home medications  - PT/OT  - Plan to return to SNF upon discharge).          [x]Reviewed outside notes   [x]Reviewed labs and imaging          Prophylaxis:  [x]Lovenox  []Coumadin  []Hep SQ  []SCD’s  []H2B/PPI    []Xarelto  []Eliquis    Disposition:  []Home w/ Family   []HH PT,OT,RN   [x]SNF/LTC   []SAH/Rehab    Discussed Code Status:    [x]Full Code      []DNR     ___________________________________________________    Care Plan discussed with:    [x]Patient   []Family    []ED Care Manager  [x]ED Doc   []Specialist :    Total Time Coordinating Admission: 75 minutes   ___________________________________________________  Admitting Physician:  Francie Mederos DO, MBA, MS [facility]  Hospitalist

## 2024-11-13 NOTE — PROGRESS NOTES
4 Eyes Skin Assessment     NAME:  Toya Durand  YOB: 1955  MEDICAL RECORD NUMBER:  [id number]    The patient is being assessed for  Shift Handoff    I agree that at least one RN has performed a thorough Head to Toe Skin Assessment on the patient. ALL assessment sites listed below have been assessed.      Areas assessed by both nurses:    Head, Face, Ears, Shoulders, Back, Chest, Arms, Elbows, Hands, Sacrum. Buttock, Coccyx, Ischium, Legs. Feet and Heels, and Under Medical Devices         Does the Patient have a Wound? No noted wound(s)       Guru Prevention initiated by RN: No  Wound Care Orders initiated by RN: No    Pressure Injury (Stage 3,4, Unstageable, DTI, NWPT, and Complex wounds) if present, place Wound referral order by RN under : No    New Ostomies, if present place, Ostomy referral order under : No     Nurse 1 eSignature: Electronically signed by Sharee Thakkar RN on 11/13/24 at 6:34 AM EST    **SHARE this note so that the co-signing nurse can place an eSignature**    Nurse 2 eSignature: {Esignature:[id number]}

## 2024-11-13 NOTE — PROGRESS NOTES
Advance Care Planning   Healthcare Decision Maker:    Primary Decision Maker: Vann,Lugonda - Other Relative, Legal Guardian - [phone]    Secondary Decision Maker: Muriel Lizarraga - Friend, Legal Guardian - [phone]      Today we documented Decision Maker(s) consistent with ACP documents on file.       Spiritual Health History and Assessment/Progress Note  [facility]    (P) Spiritual/Emotional Needs, Advance Care Planning,  ,  ,      Name: Toya Durand MRN: [id number]    Age: 69 y.o.     Sex: female   Language: English   Orthodox: Cheondoism   Altered mental status     Date: 11/13/2024            Total Time Calculated: (P) 7 min              Spiritual Assessment began in [facility] 2S TELEMETRY        Referral/Consult From: (P) Multi-disciplinary team   Encounter Overview/Reason: (P) Spiritual/Emotional Needs, Advance Care Planning  Service Provided For: (P) Patient    Natalya, Belief, Meaning:   Patient unable to assess at this time  Family/Friends No family/friends present      Importance and Influence:  Patient unable to assess at this time  Family/Friends No family/friends present    Community:  Patient Other: no family at bedside  Family/Friends No family/friends present    Assessment and Plan of Care:     Patient Interventions include: Facilitated expression of thoughts and feelings  Family/Friends Interventions include: No family/friends present    Patient Plan of Care: Spiritual Care available upon further referral  Family/Friends Plan of Care: Spiritual Care available upon further referral    Electronically signed by NICHOL Beckman on 11/13/2024 at 4:59 PM

## 2024-11-13 NOTE — PROGRESS NOTES
POA left after admission unable to complete screening form due to patient being alert and oriented to only self. Signed.

## 2024-11-13 NOTE — PROGRESS NOTES
MRI screening form needs to be filled out and faxed to [phone] BEFORE MRI can be scheduled.  If unable to obtain information from patient , MPOA needs to be contacted . If patient is claustrophobic or will needs pain meds, please have ordered in advance in order to facilitate exam.

## 2024-11-13 NOTE — PROGRESS NOTES
Occupational Therapy    Orders received, chart reviewed. OT attempted pt x2 this morning but pt w/ pt care. OT to re-attempt at a later time as scheduling allows.     Thank you for this referral.   Vincent Dill MS, OTR/L

## 2024-11-13 NOTE — PROGRESS NOTES
Pharmacist Review and Automatic Dose Adjustment of Prophylactic Enoxaparin    *Review reason for admission/hospital problem list*    The reviewing pharmacist has made an adjustment to the ordered enoxaparin dose or converted to UFH per the approved [facility] protocol and table as identified below.        Toya Durand is a 69 y.o. female.     Recent Labs     11/12/24 1936   CREATININE 1.28       Estimated Creatinine Clearance: 57 mL/min (based on SCr of 1.28 mg/dL).    Height:   Ht Readings from Last 1 Encounters:   11/12/24 1.753 m (5' 9\")     Weight:  Wt Readings from Last 1 Encounters:   11/13/24 114.7 kg (252 lb 14.4 oz)               Plan: Based upon the patient's weight and renal function, the ordered enoxaparin dose of 40 mg daily has been changed/converted to 30 mg BID      Thank you,  Sagrario Kelsey [facility]

## 2024-11-14 ENCOUNTER — APPOINTMENT (OUTPATIENT)
Facility: HOSPITAL | Age: 69
End: 2024-11-14
Attending: STUDENT IN AN ORGANIZED HEALTH CARE EDUCATION/TRAINING PROGRAM
Payer: MEDICARE

## 2024-11-14 LAB
ALBUMIN SERPL-MCNC: 2.7 G/DL (ref 3.4–5)
ALBUMIN/GLOB SERPL: 0.8 (ref 0.8–1.7)
ALP SERPL-CCNC: 66 U/L (ref 45–117)
ALT SERPL-CCNC: 30 U/L (ref 13–56)
ANION GAP SERPL CALC-SCNC: 6 MMOL/L (ref 3–18)
AST SERPL-CCNC: 65 U/L (ref 10–38)
BACTERIA SPEC CULT: NORMAL
BASOPHILS # BLD: 0 K/UL (ref 0–0.1)
BASOPHILS NFR BLD: 1 % (ref 0–2)
BILIRUB SERPL-MCNC: 1 MG/DL (ref 0.2–1)
BUN SERPL-MCNC: 21 MG/DL (ref 7–18)
BUN/CREAT SERPL: 22 (ref 12–20)
CALCIUM SERPL-MCNC: 8.7 MG/DL (ref 8.5–10.1)
CHLORIDE SERPL-SCNC: 105 MMOL/L (ref 100–111)
CHOLEST SERPL-MCNC: 137 MG/DL
CO2 SERPL-SCNC: 29 MMOL/L (ref 21–32)
CREAT SERPL-MCNC: 0.97 MG/DL (ref 0.6–1.3)
DIFFERENTIAL METHOD BLD: ABNORMAL
ECHO AO ASC DIAM: 2.8 CM
ECHO AO ASCENDING AORTA INDEX: 1.23 CM/M2
ECHO AO ROOT DIAM: 3 CM
ECHO AO ROOT INDEX: 1.32 CM/M2
ECHO AV AREA PEAK VELOCITY: 1.8 CM2
ECHO AV AREA VTI: 1.9 CM2
ECHO AV AREA/BSA PEAK VELOCITY: 0.8 CM2/M2
ECHO AV AREA/BSA VTI: 0.8 CM2/M2
ECHO AV MEAN GRADIENT: 6 MMHG
ECHO AV MEAN VELOCITY: 1.2 M/S
ECHO AV PEAK GRADIENT: 12 MMHG
ECHO AV PEAK VELOCITY: 1.8 M/S
ECHO AV VELOCITY RATIO: 0.5
ECHO AV VTI: 36.7 CM
ECHO BSA: 2.36 M2
ECHO EST RA PRESSURE: 3 MMHG
ECHO LA VOL A-L A2C: 47 ML (ref 22–52)
ECHO LA VOL A-L A4C: 34 ML (ref 22–52)
ECHO LA VOL BP: 37 ML (ref 22–52)
ECHO LA VOL MOD A2C: 44 ML (ref 22–52)
ECHO LA VOL MOD A4C: 31 ML (ref 22–52)
ECHO LA VOL/BSA BIPLANE: 16 ML/M2 (ref 16–34)
ECHO LA VOLUME AREA LENGTH: 41 ML
ECHO LA VOLUME INDEX A-L A2C: 21 ML/M2 (ref 16–34)
ECHO LA VOLUME INDEX A-L A4C: 15 ML/M2 (ref 16–34)
ECHO LA VOLUME INDEX AREA LENGTH: 18 ML/M2 (ref 16–34)
ECHO LA VOLUME INDEX MOD A2C: 19 ML/M2 (ref 16–34)
ECHO LA VOLUME INDEX MOD A4C: 14 ML/M2 (ref 16–34)
ECHO LV E' LATERAL VELOCITY: 13.24 CM/S
ECHO LV E' SEPTAL VELOCITY: 8.53 CM/S
ECHO LV EF PHYSICIAN: 55 %
ECHO LV FRACTIONAL SHORTENING: 28 % (ref 28–44)
ECHO LV INTERNAL DIMENSION DIASTOLE INDEX: 2.37 CM/M2
ECHO LV INTERNAL DIMENSION DIASTOLIC: 5.4 CM (ref 3.9–5.3)
ECHO LV INTERNAL DIMENSION SYSTOLIC INDEX: 1.71 CM/M2
ECHO LV INTERNAL DIMENSION SYSTOLIC: 3.9 CM
ECHO LV IVSD: 0.6 CM (ref 0.6–0.9)
ECHO LV MASS 2D: 131.2 G (ref 67–162)
ECHO LV MASS INDEX 2D: 57.5 G/M2 (ref 43–95)
ECHO LV POSTERIOR WALL DIASTOLIC: 0.8 CM (ref 0.6–0.9)
ECHO LV RELATIVE WALL THICKNESS RATIO: 0.3
ECHO LVOT AREA: 3.5 CM2
ECHO LVOT AV VTI INDEX: 0.55
ECHO LVOT DIAM: 2.1 CM
ECHO LVOT MEAN GRADIENT: 2 MMHG
ECHO LVOT PEAK GRADIENT: 4 MMHG
ECHO LVOT PEAK VELOCITY: 0.9 M/S
ECHO LVOT STROKE VOLUME INDEX: 30.8 ML/M2
ECHO LVOT SV: 70.3 ML
ECHO LVOT VTI: 20.3 CM
ECHO MV A VELOCITY: 0.57 M/S
ECHO MV AREA VTI: 2.3 CM2
ECHO MV E DECELERATION TIME (DT): 264.8 MS
ECHO MV E VELOCITY: 0.77 M/S
ECHO MV E/A RATIO: 1.35
ECHO MV E/E' LATERAL: 5.82
ECHO MV E/E' RATIO (AVERAGED): 7.42
ECHO MV E/E' SEPTAL: 9.03
ECHO MV LVOT VTI INDEX: 1.49
ECHO MV MAX VELOCITY: 1 M/S
ECHO MV MEAN GRADIENT: 1 MMHG
ECHO MV MEAN VELOCITY: 0.4 M/S
ECHO MV PEAK GRADIENT: 4 MMHG
ECHO MV VTI: 30.3 CM
ECHO PV MAX VELOCITY: 0.9 M/S
ECHO PV PEAK GRADIENT: 3 MMHG
ECHO RIGHT VENTRICULAR SYSTOLIC PRESSURE (RVSP): 20 MMHG
ECHO RVOT PEAK GRADIENT: 2 MMHG
ECHO RVOT PEAK VELOCITY: 0.7 M/S
ECHO TV REGURGITANT MAX VELOCITY: 2.06 M/S
ECHO TV REGURGITANT PEAK GRADIENT: 17 MMHG
EOSINOPHIL # BLD: 0.1 K/UL (ref 0–0.4)
EOSINOPHIL NFR BLD: 2 % (ref 0–5)
ERYTHROCYTE [DISTWIDTH] IN BLOOD BY AUTOMATED COUNT: 14.2 % (ref 11.6–14.5)
GLOBULIN SER CALC-MCNC: 3.3 G/DL (ref 2–4)
GLUCOSE SERPL-MCNC: 95 MG/DL (ref 74–99)
HCT VFR BLD AUTO: 30.7 % (ref 35–45)
HDLC SERPL-MCNC: 58 MG/DL (ref 40–60)
HDLC SERPL: 2.4 (ref 0–5)
HGB BLD-MCNC: 9.6 G/DL (ref 12–16)
IMM GRANULOCYTES # BLD AUTO: 0 K/UL (ref 0–0.04)
IMM GRANULOCYTES NFR BLD AUTO: 1 % (ref 0–0.5)
LDLC SERPL CALC-MCNC: 72.6 MG/DL (ref 0–100)
LIPID PANEL: NORMAL
LYMPHOCYTES # BLD: 1.6 K/UL (ref 0.9–3.6)
LYMPHOCYTES NFR BLD: 25 % (ref 21–52)
MCH RBC QN AUTO: 29.4 PG (ref 24–34)
MCHC RBC AUTO-ENTMCNC: 31.3 G/DL (ref 31–37)
MCV RBC AUTO: 93.9 FL (ref 78–100)
MONOCYTES # BLD: 0.8 K/UL (ref 0.05–1.2)
MONOCYTES NFR BLD: 13 % (ref 3–10)
NEUTS SEG # BLD: 3.8 K/UL (ref 1.8–8)
NEUTS SEG NFR BLD: 59 % (ref 40–73)
NRBC # BLD: 0 K/UL (ref 0–0.01)
NRBC BLD-RTO: 0 PER 100 WBC
PLATELET # BLD AUTO: 155 K/UL (ref 135–420)
PMV BLD AUTO: 10.4 FL (ref 9.2–11.8)
POTASSIUM SERPL-SCNC: 3.6 MMOL/L (ref 3.5–5.5)
PROT SERPL-MCNC: 6 G/DL (ref 6.4–8.2)
RBC # BLD AUTO: 3.27 M/UL (ref 4.2–5.3)
SERVICE CMNT-IMP: NORMAL
SODIUM SERPL-SCNC: 140 MMOL/L (ref 136–145)
TRIGL SERPL-MCNC: 32 MG/DL
VLDLC SERPL CALC-MCNC: 6.4 MG/DL
WBC # BLD AUTO: 6.5 K/UL (ref 4.6–13.2)

## 2024-11-14 PROCEDURE — 93306 TTE W/DOPPLER COMPLETE: CPT

## 2024-11-14 PROCEDURE — 85025 COMPLETE CBC W/AUTO DIFF WBC: CPT

## 2024-11-14 PROCEDURE — 2580000003 HC RX 258: Performed by: STUDENT IN AN ORGANIZED HEALTH CARE EDUCATION/TRAINING PROGRAM

## 2024-11-14 PROCEDURE — 36415 COLL VENOUS BLD VENIPUNCTURE: CPT

## 2024-11-14 PROCEDURE — 94761 N-INVAS EAR/PLS OXIMETRY MLT: CPT

## 2024-11-14 PROCEDURE — 94640 AIRWAY INHALATION TREATMENT: CPT

## 2024-11-14 PROCEDURE — 99232 SBSQ HOSP IP/OBS MODERATE 35: CPT | Performed by: STUDENT IN AN ORGANIZED HEALTH CARE EDUCATION/TRAINING PROGRAM

## 2024-11-14 PROCEDURE — 93306 TTE W/DOPPLER COMPLETE: CPT | Performed by: INTERNAL MEDICINE

## 2024-11-14 PROCEDURE — 80061 LIPID PANEL: CPT

## 2024-11-14 PROCEDURE — 80053 COMPREHEN METABOLIC PANEL: CPT

## 2024-11-14 PROCEDURE — 6370000000 HC RX 637 (ALT 250 FOR IP): Performed by: STUDENT IN AN ORGANIZED HEALTH CARE EDUCATION/TRAINING PROGRAM

## 2024-11-14 PROCEDURE — 97162 PT EVAL MOD COMPLEX 30 MIN: CPT

## 2024-11-14 PROCEDURE — 97166 OT EVAL MOD COMPLEX 45 MIN: CPT

## 2024-11-14 PROCEDURE — 6360000002 HC RX W HCPCS: Performed by: STUDENT IN AN ORGANIZED HEALTH CARE EDUCATION/TRAINING PROGRAM

## 2024-11-14 PROCEDURE — 97530 THERAPEUTIC ACTIVITIES: CPT

## 2024-11-14 PROCEDURE — 1100000003 HC PRIVATE W/ TELEMETRY

## 2024-11-14 RX ADMIN — ATENOLOL 50 MG: 50 TABLET ORAL at 08:05

## 2024-11-14 RX ADMIN — MEMANTINE 10 MG: 10 TABLET ORAL at 22:30

## 2024-11-14 RX ADMIN — SODIUM CHLORIDE, PRESERVATIVE FREE 10 ML: 5 INJECTION INTRAVENOUS at 08:05

## 2024-11-14 RX ADMIN — ENOXAPARIN SODIUM 30 MG: 100 INJECTION SUBCUTANEOUS at 21:00

## 2024-11-14 RX ADMIN — MEMANTINE 10 MG: 10 TABLET ORAL at 08:05

## 2024-11-14 RX ADMIN — ENOXAPARIN SODIUM 30 MG: 100 INJECTION SUBCUTANEOUS at 08:05

## 2024-11-14 RX ADMIN — DONEPEZIL HYDROCHLORIDE 10 MG: 5 TABLET, FILM COATED ORAL at 22:30

## 2024-11-14 RX ADMIN — GABAPENTIN 100 MG: 100 CAPSULE ORAL at 08:05

## 2024-11-14 RX ADMIN — HALOPERIDOL LACTATE 5 MG: 5 INJECTION, SOLUTION INTRAMUSCULAR at 22:38

## 2024-11-14 RX ADMIN — Medication 3 MG: at 22:30

## 2024-11-14 RX ADMIN — ATORVASTATIN CALCIUM 10 MG: 10 TABLET, FILM COATED ORAL at 08:05

## 2024-11-14 RX ADMIN — ARFORMOTEROL TARTRATE: 15 SOLUTION RESPIRATORY (INHALATION) at 19:58

## 2024-11-14 RX ADMIN — GABAPENTIN 100 MG: 100 CAPSULE ORAL at 21:00

## 2024-11-14 RX ADMIN — ARFORMOTEROL TARTRATE: 15 SOLUTION RESPIRATORY (INHALATION) at 07:07

## 2024-11-14 ASSESSMENT — PAIN SCALES - PAIN ASSESSMENT IN ADVANCED DEMENTIA (PAINAD)
CONSOLABILITY: NO NEED TO CONSOLE
BODYLANGUAGE: RELAXED
BREATHING: NORMAL
BODYLANGUAGE: RELAXED
FACIALEXPRESSION: SMILING OR INEXPRESSIVE
TOTALSCORE: 0
BODYLANGUAGE: RELAXED
CONSOLABILITY: NO NEED TO CONSOLE
TOTALSCORE: 0
TOTALSCORE: 0
BREATHING: NORMAL
CONSOLABILITY: NO NEED TO CONSOLE
CONSOLABILITY: NO NEED TO CONSOLE
FACIALEXPRESSION: SMILING OR INEXPRESSIVE
BODYLANGUAGE: RELAXED
TOTALSCORE: 0
FACIALEXPRESSION: SMILING OR INEXPRESSIVE
BREATHING: NORMAL
TOTALSCORE: 0
BREATHING: NORMAL
TOTALSCORE: 0
TOTALSCORE: 0
BODYLANGUAGE: RELAXED
BREATHING: NORMAL
FACIALEXPRESSION: SMILING OR INEXPRESSIVE
CONSOLABILITY: NO NEED TO CONSOLE
FACIALEXPRESSION: SMILING OR INEXPRESSIVE
TOTALSCORE: 0
CONSOLABILITY: NO NEED TO CONSOLE
BREATHING: NORMAL
BREATHING: NORMAL
BODYLANGUAGE: RELAXED
CONSOLABILITY: NO NEED TO CONSOLE
CONSOLABILITY: NO NEED TO CONSOLE
BREATHING: NORMAL

## 2024-11-14 ASSESSMENT — PAIN SCALES - GENERAL: PAINLEVEL_OUTOF10: 0

## 2024-11-14 NOTE — PROGRESS NOTES
Progress Note  Hospitalist Service    Patient: Toya Durand MRN: [id number]   SSN: [id number]  YOB: 1955   Age: 69 y.o.  Sex: female      Admit Date: 2024    LOS: 1 day   Chief Complaint   Patient presents with    Fatigue    Leg Swelling       Subjective:     Patient seen and examined.   On examination, patient is exceptionally pleasant.  Patient would stay awake for period of time and then promptly fall back asleep.  While she was conversing, patient was unable to tell me her name or her date of birth.   Her friend and legal guardian was at bedside today.  I also spoke to her second legal guardian via phone.  Per Ms. Tellez -patient had been becoming less responsive over the past few days.  While at the nursing home, patient apparently laid her head down into her food at dinnertime and was less interactive from time to time.   Patient recently finished antibiotics for urinary tract infection.  She was also started on Lasix for pretibial edema.     Objective:     Vitals:  BP (!) 181/92   Pulse 54   Temp 98.3 °F (36.8 °C) (Oral)   Resp 18   Ht 1.753 m (5' 9\")   Wt 114.7 kg (252 lb 14.4 oz)   SpO2 99%   BMI 37.35 kg/m²     Physical Exam:   General appearance: Intermittently alert.  Will make eye contact and smile.   Lungs: clear to auscultation bilaterally  Heart: regular rate and rhythm, S1, S2 normal, no murmur, click, rub or gallop  Abdomen: soft. No grimace with palpation.   Pulses: 2+ and symmetric  Skin: Skin color, texture, turgor normal. No rashes or lesions  Neuro: No focal neurological deficits. Intermittently will speak but answers are not congruent with questions being asked. Cannot recite her name, . Does not answer questions concerning pain.     Intake and Output:  Current Shift: No intake/output data recorded.  Last three shifts:  07 -  1900  In: 360 [P.O.:360]  Out: 295 [Urine:295]    Lab/Data Review:  No results found for this or any previous visit (from the

## 2024-11-14 NOTE — PROGRESS NOTES
4 Eyes Skin Assessment     NAME:  Toya Durand  YOB: 1955  MEDICAL RECORD NUMBER:  [id number]    The patient is being assessed for  Shift Handoff    I agree that at least one RN has performed a thorough Head to Toe Skin Assessment on the patient. ALL assessment sites listed below have been assessed.      Areas assessed by both nurses:    Head, Face, Ears, Shoulders, Back, Chest, Arms, Elbows, Hands, Sacrum. Buttock, Coccyx, Ischium, Legs. Feet and Heels, and Under Medical Devices         Does the Patient have a Wound? No noted wound(s)       Guru Prevention initiated by RN: Yes  Wound Care Orders initiated by RN: No    Pressure Injury (Stage 3,4, Unstageable, DTI, NWPT, and Complex wounds) if present, place Wound referral order by RN under : No    New Ostomies, if present place, Ostomy referral order under : No     Nurse 1 eSignature: Electronically signed by Betsey Santoyo RN on 11/14/24 at 5:58 PM EST    **SHARE this note so that the co-signing nurse can place an eSignature**    Nurse 2 eSignature: {Esignature:[id number]}

## 2024-11-14 NOTE — PROGRESS NOTES
Physical Therapy  PHYSICAL THERAPY EVALUATION/DISCHARGE    Patient: Toya Durand (69 y.o. female)  Date: 2024  Primary Diagnosis: Bradycardia [R00.1]  Altered mental status [R41.82]  Bilateral lower extremity edema [R60.0]  Mild dementia without behavioral disturbance, psychotic disturbance, mood disturbance, or anxiety, unspecified dementia type (HCC) [F03.A0]       Precautions: Fall Risk, General Precautions, Skin,  ,  ,  ,  ,  ,  ,    PLOF: Admitted from [facility] per chart review. Pt unable to provide PLF     ASSESSMENT AND RECOMMENDATIONS:  Pt cleared by nursing. Pt received in bed, educated on PT role and evaluation process and  nods her head for approval. Pt answers to name, but unable to recall name, , situation, place, or time. Pt inconsistently responds with intermittent bouts of blank stares after questions. Pt requires frequent redirection, and-over-hand for sequencing. Unable to get to EOB due to pt actively resisting. Pt has bouts on speech that is incredibly soft and incoherent. Unable to follow commands at this time. Total to scoot up in bed. Call bell and all needs left within reach. Pt is at baseline and skilled acute care PT is not indicated at this time, will sign off.       Patient does not require further skilled physical therapy intervention at this level of care.    Further Equipment Recommendations for Discharge: hospital bed, mechanical lift, and wheelchair 18 inch    [facility]: AM-PAC Inpatient Mobility Raw Score : 6      Current research shows that an AM-PAC score of 17 (13 without stairs) or less is not associated with a discharge to the patient's home setting.    This AMPA score should be considered in conjunction with interdisciplinary team recommendations to determine the most appropriate discharge setting. Patient's social support, diagnosis, medical stability, and prior level of function should also be taken into consideration.     SUBJECTIVE:   Patient stated “the ball and the  within reach  [x]         Nursing notified  [x]         Sitter present  [x]         Bed alarm activated  []         SCDs applied    COMMUNICATION/EDUCATION:   Patient Education  Education Given To: Patient  Education Provided: Role of Therapy;Plan of Care  Education Method: Demonstration;Verbal;Teach Back  Barriers to Learning: Cognition  Education Outcome: Unable to verbalize;Unable to demonstrate understanding    Thank you for this referral.  Spring Montesinos, PT  Minutes: 16      Eval Complexity: Decision Making: Medium Complexity

## 2024-11-14 NOTE — PROGRESS NOTES
4 Eyes Skin Assessment     NAME:  Toya Durand  YOB: 1955  MEDICAL RECORD NUMBER:  [id number]    The patient is being assessed for  Shift Handoff    I agree that at least one RN has performed a thorough Head to Toe Skin Assessment on the patient. ALL assessment sites listed below have been assessed.      Areas assessed by both nurses:    Head, Face, Ears, Shoulders, Back, Chest, Arms, Elbows, Hands, Sacrum. Buttock, Coccyx, Ischium, Legs. Feet and Heels, and Under Medical Devices         Does the Patient have a Wound? No noted wound(s)       Guru Prevention initiated by RN: No  Wound Care Orders initiated by RN: No    Pressure Injury (Stage 3,4, Unstageable, DTI, NWPT, and Complex wounds) if present, place Wound referral order by RN under : No    New Ostomies, if present place, Ostomy referral order under : No     Nurse 1 eSignature: Electronically signed by Sharee Thakkar RN on 11/14/24 at 7:34 AM EST    **SHARE this note so that the co-signing nurse can place an eSignature**    Nurse 2 eSignature: Electronically signed by Betsey Santoyo RN on 11/14/24 at 9:08 AM EST

## 2024-11-14 NOTE — CARE COORDINATION
received call back from Toya with [facility],per Toya the SANCHEZ will need a hard script if patient is returning with  any new medications and order for PT/OT if recommended.

## 2024-11-14 NOTE — PLAN OF CARE
Signed.  Problem: Discharge Planning  Goal: Discharge to home or other facility with appropriate resources  Outcome: Progressing  Flowsheets (Taken 11/14/2024 0111)  Discharge to home or other facility with appropriate resources:   Identify barriers to discharge with patient and caregiver   Identify discharge learning needs (meds, wound care, etc)  Note: Possible discharge today given education on medication.      Problem: Confusion  Goal: Confusion, delirium, dementia, or psychosis is improved or at baseline  Description: INTERVENTIONS:  1. Assess for possible contributors to thought disturbance, including medications, impaired vision or hearing, underlying metabolic abnormalities, dehydration, psychiatric diagnoses, and notify attending LIP  2. [place] high risk fall precautions, as indicated  3. Provide frequent short contacts to provide reality reorientation, refocusing and direction  4. Decrease environmental stimuli, including noise as appropriate  5. Monitor and intervene to maintain adequate nutrition, hydration, elimination, sleep and activity  6. If unable to ensure safety without constant attention obtain sitter and review sitter guidelines with assigned personnel  7. Initiate Psychosocial CNS and Spiritual Care consult, as indicated  Outcome: Progressing  Flowsheets (Taken 11/14/2024 0111)  Effect of thought disturbance (confusion, delirium, dementia, or psychosis) are managed with adequate functional status:   Assess for contributors to thought disturbance, including medications, impaired vision or hearing, underlying metabolic abnormalities, dehydration, psychiatric diagnoses, notify LIP   [place] high risk fall precautions, as indicated   Provide frequent short contacts to provide reality reorientation, refocusing and direction  Note: Continues to be oriented to self at baseline.      Problem: Skin/Tissue Integrity  Goal: Absence of new skin breakdown  Description: 1.  Monitor for areas of redness  and/or skin breakdown  2.  Assess vascular access sites hourly  3.  Every 4-6 hours minimum:  Change oxygen saturation probe site  4.  Every 4-6 hours:  If on nasal continuous positive airway pressure, respiratory therapy assess nares and determine need for appliance change or resting period.  Outcome: Progressing  Note: No new breakdown upon assessment.     Problem: Safety - Adult  Goal: Free from fall injury  Outcome: Progressing  Flowsheets (Taken 11/14/2024 0111)  Free From Fall Injury: Instruct family/caregiver on patient safety  Note: With sitter at bedside remains free from falls.     Problem: Neurosensory - Adult  Goal: Achieves stable or improved neurological status  Outcome: Progressing  Flowsheets (Taken 11/14/2024 0111)  Achieves stable or improved neurological status: Assess for and report changes in neurological status  Note: Remains at baseline alert and oriented to self.      Problem: Cardiovascular - Adult  Goal: Absence of cardiac dysrhythmias or at baseline  Outcome: Progressing  Flowsheets (Taken 11/14/2024 0111)  Absence of cardiac dysrhythmias or at baseline:   Monitor cardiac rate and rhythm   Assess for signs of decreased cardiac output     Problem: Musculoskeletal - Adult  Goal: Return mobility to safest level of function  Outcome: Progressing  Flowsheets (Taken 11/14/2024 0111)  Return Mobility to Safest Level of Function:   Assess patient stability and activity tolerance for standing, transferring and ambulating with or without assistive devices   Assist with transfers and ambulation using safe patient handling equipment as needed  Goal: Return ADL status to a safe level of function  Outcome: Progressing  Flowsheets (Taken 11/14/2024 0111)  Return ADL Status to a Safe Level of Function: Assess activities of daily living deficits and provide assistive devices as needed  Note: Patient continues to be at baseline feeding self with verbal direction and following self care with direction and

## 2024-11-14 NOTE — PLAN OF CARE
Problem: Discharge Planning  Goal: Discharge to home or other facility with appropriate resources  11/14/2024 0916 by Betsey Santoyo RN  Outcome: Progressing  Flowsheets (Taken 11/14/2024 0916)  Discharge to home or other facility with appropriate resources:   Identify barriers to discharge with patient and caregiver   Arrange for needed discharge resources and transportation as appropriate   Identify discharge learning needs (meds, wound care, etc)  11/14/2024 0111 by Sharee Thakkar, RN  Outcome: Progressing  Flowsheets (Taken 11/14/2024 0111)  Discharge to home or other facility with appropriate resources:   Identify barriers to discharge with patient and caregiver   Identify discharge learning needs (meds, wound care, etc)  Note: Possible discharge today given education on medication.      Problem: Confusion  Goal: Confusion, delirium, dementia, or psychosis is improved or at baseline  Description: INTERVENTIONS:  1. Assess for possible contributors to thought disturbance, including medications, impaired vision or hearing, underlying metabolic abnormalities, dehydration, psychiatric diagnoses, and notify attending LIP  2. [place] high risk fall precautions, as indicated  3. Provide frequent short contacts to provide reality reorientation, refocusing and direction  4. Decrease environmental stimuli, including noise as appropriate  5. Monitor and intervene to maintain adequate nutrition, hydration, elimination, sleep and activity  6. If unable to ensure safety without constant attention obtain sitter and review sitter guidelines with assigned personnel  7. Initiate Psychosocial CNS and Spiritual Care consult, as indicated  11/14/2024 0916 by Betsey Santoyo RN  Outcome: Progressing  Flowsheets (Taken 11/14/2024 0111 by Sharee Thakkar, RN)  Effect of thought disturbance (confusion, delirium, dementia, or psychosis) are managed with adequate functional status:   Assess for contributors  safe level of function  11/14/2024 0111 by Sharee Thakkar RN  Outcome: Progressing  Flowsheets (Taken 11/14/2024 0111)  Return ADL Status to a Safe Level of Function: Assess activities of daily living deficits and provide assistive devices as needed  Note: Patient continues to be at baseline feeding self with verbal direction and following self care with direction and minimal to moderate assistance.      Problem: Cardiovascular - Adult  Goal: Absence of cardiac dysrhythmias or at baseline  11/14/2024 0916 by Betsey Santoyo RN  Outcome: Completed  11/14/2024 0111 by Sharee Thakkar RN  Outcome: Progressing  Flowsheets (Taken 11/14/2024 0111)  Absence of cardiac dysrhythmias or at baseline:   Monitor cardiac rate and rhythm   Assess for signs of decreased cardiac output     Problem: Gastrointestinal - Adult  Goal: Maintains or returns to baseline bowel function  11/14/2024 0111 by Sharee Tahkkar RN  Outcome: Completed  Flowsheets (Taken 11/14/2024 0111)  Maintains or returns to baseline bowel function: Assess bowel function  Note: Has regular bowel movements     Problem: Pain  Goal: Verbalizes/displays adequate comfort level or baseline comfort level  11/14/2024 0916 by Betsey Santoyo RN  Outcome: Completed  11/14/2024 0111 by Sharee Thakkar RN  Outcome: Progressing  Flowsheets (Taken 11/14/2024 0111)  Verbalizes/displays adequate comfort level or baseline comfort level:   Encourage patient to monitor pain and request assistance   Assess pain using appropriate pain scale   Implement non-pharmacological measures as appropriate and evaluate response     Problem: Genitourinary - Adult  Goal: Absence of urinary retention  11/14/2024 0916 by Betsey Santoyo RN  Outcome: Completed  11/14/2024 0111 by Sharee Thakkar RN  Outcome: Progressing  Flowsheets (Taken 11/14/2024 0111)  Absence of urinary retention:   Assess patient’s ability to void and empty

## 2024-11-14 NOTE — CARE COORDINATION
11/14/24 0916   Service Assessment   Patient Orientation Alert and Oriented   Cognition Dementia / Early Alzheimer's   History Provided By Other (see comment)  (Lugonda Vann)   Primary Caregiver Other (Comment)  (Assisted Living Facility)   Accompanied By/Relationship n/a   Support Systems Friends/Neighbors   Patient's Healthcare Decision Maker is: Named in Scanned ACP Document   PCP Verified by CM Yes   Last Visit to PCP Within last 3 months   Prior Functional Level Independent in ADLs/IADLs   Current Functional Level Assistance with the following:   Can patient return to prior living arrangement Yes   Ability to make needs known: Poor   Family able to assist with home care needs: Yes   Would you like for me to discuss the discharge plan with any other family members/significant others, and if so, who? No   Financial Resources Medicare   Community Resources None   CM/SW Referral Other (see comment)  (TBD)   Social/Functional History   Lives With Other (comment)  ([facility])   Type of Home Assisted living   Home Layout One level   Home Access Ramped entrance   Bathroom Toilet Standard   Bathroom Accessibility Accessible   Home Equipment None   Receives Help From Family   ADL Assistance Independent   Homemaking Assistance Independent   Ambulation Assistance Independent   Transfer Assistance Independent   Active  No   Patient's  Info Lugonda Vann   Occupation Retired   Discharge Planning   Type of Residence Assisted living  ([facility])   Living Arrangements Other (Comment)   Current Services Prior To Admission None   Potential Assistance Needed N/A  ([facility])   DME Ordered? No   Potential Assistance Purchasing Medications No   Type of Home Care Services Aide Services;PT;OT   Patient expects to be discharged to: Assisted living  ([facility])   Services At/After Discharge   Transition of Care Consult (CM Consult)

## 2024-11-14 NOTE — PROGRESS NOTES
Occupational Therapy    OCCUPATIONAL THERAPY EVALUATION/DISCHARGE    Patient: Toya Durand (69 y.o. female)  Date: 11/14/2024  Primary Diagnosis: Bradycardia [R00.1]  Altered mental status [R41.82]  Bilateral lower extremity edema [R60.0]  Mild dementia without behavioral disturbance, psychotic disturbance, mood disturbance, or anxiety, unspecified dementia type (HCC) [F03.A0]       Precautions: Fall Risk, General Precautions,   PLOF: Per CM note, pt resides at [facility]. Unsure of PLOF.     ASSESSMENT AND RECOMMENDATIONS:  Pt semi supine w/ sitter present in room. Pt disoriented x4, not following commands,spontaneously closes eyes. Pt w/ delayed/inconsistent responses, and speaking word salad/jibberish. Attempted supine>sit EOB in preparation for seated ADLs, however pt very not initiating, and resistive when assist was provided. Total A to scoot toward HOB in supine. Pt not appropriate for further skilled occupational therapy at this time.   Pt left semi supine, all needs in reach, sitter present in room, bed alarm set.     Maximum therapeutic gains met at current level of care and patient will be discharged from occupational therapy at this time.    Further Equipment Recommendations for Discharge: wheelchair 22 inch, hospital bed, talat lift    [facility]: AM-PAC Inpatient Daily Activity Raw Score: 9    Current research shows that an AM-PAC score of 17 or less is not associated with a discharge to the patient's home setting.    This [facility] score should be considered in conjunction with interdisciplinary team recommendations to determine the most appropriate discharge setting. Patient's social support, diagnosis, medical stability, and prior level of function should also be taken into consideration.     SUBJECTIVE:   Patient stated “little girls.”    OBJECTIVE DATA SUMMARY:   No past medical history on file.No past surgical history on file.    Home Situation:   Social/Functional History  Lives With: Other (comment) ([facility] per  CM note)  Type of Home: Assisted living  Home Layout: One level  Home Access: Ramped entrance  Bathroom Toilet: Standard  Bathroom Accessibility: Accessible  Home Equipment: None  Receives Help From: Family  ADL Assistance: Independent  Homemaking Assistance: Independent  Ambulation Assistance: Independent  Transfer Assistance: Independent  Active : No  Patient's  Info: Lugonda Vann  Occupation: Retired    Cognitive/Behavioral Status:  Orientation  Orientation Level: Disoriented X4  Cognition  Overall Cognitive Status: Exceptions  Arousal/Alertness: Inconsistent responses to stimuli  Following Commands: Impaired  Attention Span: Impaired  Memory: Impaired  Safety Judgement: Impaired  Problem Solving: Impaired  Insights: Not aware of deficits  Initiation: Impaired  Sequencing: Impaired    Vision/Perceptual:    Vision  Vision: Within Functional Limits       Functional Mobility and Transfers for ADLs:  Bed Mobility:  Bed Mobility Training  Bed Mobility Training: Yes  Scooting: Total assistance  Transfers:    ADL Assessment:   Feeding: Maximum assistance  Grooming: Maximum assistance  UE Bathing: Maximum assistance  LE Bathing: Dependent/Total  UE Dressing: Dependent/Total  LE Dressing: Dependent/Total  Toileting: Dependent/Total     Pain:  Intensity Pre-treatment: 0/10   Intensity Post-treatment: 0/10  Scale: Adult Non-Verbal Pain Scale    Activity Tolerance:   Activity Tolerance: Treatment limited secondary to decreased cognition  Please refer to the flowsheet for vital signs taken during this treatment.    After treatment:   [] Patient left in no apparent distress sitting up in chair  [x] Patient left in no apparent distress in bed  [x] Call bell left within reach  [x] Nursing notified  [] Caregiver present  [x] Bed alarm activated    COMMUNICATION/EDUCATION:   Patient Education  Education Given To: Patient  Education Provided: Role of Therapy;Plan of Care  Education Method: Verbal  Barriers to Learning:

## 2024-11-14 NOTE — PROGRESS NOTES
Progress Note  Hospitalist Service    Patient: Toya Durand MRN: [id number]   SSN: [id number]  YOB: 1955   Age: 69 y.o.  Sex: female      Admit Date: 2024    LOS: 2 days   Chief Complaint   Patient presents with    Fatigue    Leg Swelling       Subjective:     Patient seen and examined.   Patient doing well. She was seen being fed by sitter. Patient smiling and interactive.     Objective:     Vitals:  /68   Pulse 54   Temp 98.6 °F (37 °C) (Axillary)   Resp 18   Ht 1.753 m (5' 9\")   Wt 114.3 kg (252 lb)   SpO2 96%   BMI 37.21 kg/m²     Physical Exam:   General appearance: Awake and alert. Will make eye contact and smile.   Lungs: clear to auscultation bilaterally  Heart: regular rate and rhythm, S1, S2 normal, no murmur, click, rub or gallop  Abdomen: soft. No grimace with palpation.   Pulses: 2+ and symmetric  Skin: Skin color, texture, turgor normal. No rashes or lesions  Neuro: No focal neurological deficits. Intermittently will speak but answers are not congruent with questions being asked. Cannot recite her name, . Does not answer questions concerning pain.     Intake and Output:  Current Shift: 701 - 1900  In: 340 [P.O.:340]  Out: -   Last three shifts: 1901 - 700  In: 360 [P.O.:360]  Out: 295 [Urine:295]    Lab/Data Review:  Recent Results (from the past 12 hour(s))   Echo (TTE) complete (PRN contrast/bubble/strain/3D)    Collection Time: 24  3:00 PM   Result Value Ref Range    IVSd 0.6 0.6 - 0.9 cm    LVIDd 5.4 (A) 3.9 - 5.3 cm    LVIDs 3.9 cm    LVOT Diameter 2.1 cm    LVPWd 0.8 0.6 - 0.9 cm    LVOT Peak Gradient 4 mmHg    LVOT Mean Gradient 2 mmHg    LVOT SV 70.3 ml    LVOT Peak Velocity 0.9 m/s    LVOT VTI 20.3 cm    RVOT Peak Gradient 2 mmHg    RVOT Peak Velocity 0.7 m/s    LA Volume A/L 41 mL    LA Volume A-L A4C 47 22 - 52 mL    LA Volume A-L A4C 34 22 - 52 mL    LA Volume MOD A2C 44 22 - 52 mL    LA Volume MOD A4C 31 22 - 52 mL    LA  Volume BP 37 22 - 52 mL    AV Area by Peak Velocity 1.8 cm2    AV Area by VTI 1.9 cm2    AV Peak Gradient 12 mmHg    AV Mean Gradient 6 mmHg    AV Peak Velocity 1.8 m/s    AV Mean Velocity 1.2 m/s    AV VTI 36.7 cm    MV A Velocity 0.57 m/s    MV E Wave Deceleration Time 264.8 ms    MV E Velocity 0.77 m/s    LV E' Lateral Velocity 13.24 cm/s    LV E' Septal Velocity 8.53 cm/s    MV Area by VTI 2.3 cm2    MV Peak Gradient 4 mmHg    MV Mean Gradient 1 mmHg    MV Max Velocity 1.0 m/s    MV Mean Velocity 0.4 m/s    MV VTI 30.3 cm    PV Peak Gradient 3 mmHg    PV Max Velocity 0.9 m/s    TR Peak Gradient 17 mmHg    TR Max Velocity 2.06 m/s    Ascending Aorta 2.8 cm    Aortic Root 3.0 cm    Body Surface Area 2.36 m2    Fractional Shortening 2D 28 28 - 44 %    LVIDd Index 2.37 cm/m2    LVIDs Index 1.71 cm/m2    LV RWT Ratio 0.30     LV Mass 2D 131.2 67 - 162 g    LV Mass 2D Index 57.5 43 - 95 g/m2    MV E/A 1.35     E/E' Ratio (Averaged) 7.42     E/E' Lateral 5.82     E/E' Septal 9.03     LA Volume Index BP 16 16 - 34 ml/m2    LA Volume Index A/L 18 16 - 34 mL/m2    LVOT Stroke Volume Index 30.8 mL/m2    LVOT Area 3.5 cm2    LA Volume Index A-L A2C 21 16 - 34 mL/m2    LA Volume Index A-L A4C 15 (A) 16 - 34 mL/m2    LA Volume Index MOD A2C 19 16 - 34 ml/m2    LA Volume Index MOD A4C 14 (A) 16 - 34 ml/m2    Ao Root Index 1.32 cm/m2    Ascending Aorta Index 1.23 cm/m2    AV Velocity Ratio 0.50     LVOT:AV VTI Index 0.55     SYDNIE/BSA VTI 0.8 cm2/m2    SYDNIE/BSA Peak Velocity 0.8 cm2/m2    MV:LVOT VTI Index 1.49     Est. RA Pressure 3 mmHg    RVSP 20 mmHg    EF Physician 55 %         Key Findings or tests:       Telemetry NONE   Oxygen NONE     Assessment and Plan:     Acute encephalopathy -likely metabolic in nature.   #1. Will rule out CVA. MRI completed today - not yet read.   Urinary tract infection, prior to admission.  Patient has recently finished course of Keflex.  Urinalysis on November 13 does not show signs of

## 2024-11-14 NOTE — PROGRESS NOTES
Brief Update    Called [facility] for wet read around 11am.   Patient can discharge following MRI results.     Cecily Cantu DO    Update @ 1523 - still waiting for MRI results.     Cecily Cantu DO    Update at 1706. No MRI results. Patient's discharge will be delayed until tomorrow.    Ceicly Cantu DO

## 2024-11-15 ENCOUNTER — APPOINTMENT (OUTPATIENT)
Facility: HOSPITAL | Age: 69
End: 2024-11-15
Payer: MEDICARE

## 2024-11-15 ENCOUNTER — HOSPITAL ENCOUNTER (EMERGENCY)
Facility: HOSPITAL | Age: 69
Discharge: HOME | End: 2024-11-18
Attending: EMERGENCY MEDICINE
Payer: MEDICARE

## 2024-11-15 VITALS
RESPIRATION RATE: 18 BRPM | HEART RATE: 73 BPM | SYSTOLIC BLOOD PRESSURE: 136 MMHG | HEIGHT: 69 IN | WEIGHT: 252 LBS | DIASTOLIC BLOOD PRESSURE: 63 MMHG | BODY MASS INDEX: 37.33 KG/M2 | TEMPERATURE: 98.6 F | OXYGEN SATURATION: 95 %

## 2024-11-15 DIAGNOSIS — R26.2 INABILITY TO WALK: ICD-10-CM

## 2024-11-15 DIAGNOSIS — M79.605 LEFT LEG PAIN: ICD-10-CM
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
## 2024-11-15 LAB
ALBUMIN SERPL-MCNC: 2.8 G/DL (ref 3.4–5)
ALBUMIN SERPL-MCNC: 3 G/DL (ref 3.4–5)
ALBUMIN/GLOB SERPL: 0.7 (ref 0.8–1.7)
ALBUMIN/GLOB SERPL: 0.7 (ref 0.8–1.7)
ALP SERPL-CCNC: 72 U/L (ref 45–117)
ALP SERPL-CCNC: 74 U/L (ref 45–117)
ALT SERPL-CCNC: 31 U/L (ref 13–56)
ALT SERPL-CCNC: 32 U/L (ref 13–56)
ANION GAP SERPL CALC-SCNC: 7 MMOL/L (ref 3–18)
ANION GAP SERPL CALC-SCNC: 7 MMOL/L (ref 3–18)
AST SERPL-CCNC: 47 U/L (ref 10–38)
AST SERPL-CCNC: 55 U/L (ref 10–38)
BASOPHILS # BLD: 0 K/UL (ref 0–0.1)
BASOPHILS # BLD: 0 K/UL (ref 0–0.1)
BASOPHILS NFR BLD: 0 % (ref 0–2)
BASOPHILS NFR BLD: 0 % (ref 0–2)
BILIRUB SERPL-MCNC: 0.6 MG/DL (ref 0.2–1)
BILIRUB SERPL-MCNC: 0.9 MG/DL (ref 0.2–1)
BUN SERPL-MCNC: 19 MG/DL (ref 7–18)
BUN SERPL-MCNC: 20 MG/DL (ref 7–18)
BUN/CREAT SERPL: 18 (ref 12–20)
BUN/CREAT SERPL: 18 (ref 12–20)
CALCIUM SERPL-MCNC: 8.9 MG/DL (ref 8.5–10.1)
CALCIUM SERPL-MCNC: 9.1 MG/DL (ref 8.5–10.1)
CHLORIDE SERPL-SCNC: 104 MMOL/L (ref 100–111)
CHLORIDE SERPL-SCNC: 105 MMOL/L (ref 100–111)
CO2 SERPL-SCNC: 28 MMOL/L (ref 21–32)
CO2 SERPL-SCNC: 28 MMOL/L (ref 21–32)
CREAT SERPL-MCNC: 1.08 MG/DL (ref 0.6–1.3)
CREAT SERPL-MCNC: 1.13 MG/DL (ref 0.6–1.3)
D DIMER PPP FEU-MCNC: 1.54 UG/ML(FEU)
DIFFERENTIAL METHOD BLD: ABNORMAL
DIFFERENTIAL METHOD BLD: ABNORMAL
EOSINOPHIL # BLD: 0.2 K/UL (ref 0–0.4)
EOSINOPHIL # BLD: 0.3 K/UL (ref 0–0.4)
EOSINOPHIL NFR BLD: 2 % (ref 0–5)
EOSINOPHIL NFR BLD: 3 % (ref 0–5)
ERYTHROCYTE [DISTWIDTH] IN BLOOD BY AUTOMATED COUNT: 14.2 % (ref 11.6–14.5)
ERYTHROCYTE [DISTWIDTH] IN BLOOD BY AUTOMATED COUNT: 14.3 % (ref 11.6–14.5)
GLOBULIN SER CALC-MCNC: 4.1 G/DL (ref 2–4)
GLOBULIN SER CALC-MCNC: 4.2 G/DL (ref 2–4)
GLUCOSE SERPL-MCNC: 103 MG/DL (ref 74–99)
GLUCOSE SERPL-MCNC: 113 MG/DL (ref 74–99)
HCT VFR BLD AUTO: 32.2 % (ref 35–45)
HCT VFR BLD AUTO: 35.1 % (ref 35–45)
HGB BLD-MCNC: 10.3 G/DL (ref 12–16)
HGB BLD-MCNC: 11.1 G/DL (ref 12–16)
IMM GRANULOCYTES # BLD AUTO: 0 K/UL (ref 0–0.04)
IMM GRANULOCYTES # BLD AUTO: 0 K/UL (ref 0–0.04)
IMM GRANULOCYTES NFR BLD AUTO: 0 % (ref 0–0.5)
IMM GRANULOCYTES NFR BLD AUTO: 0 % (ref 0–0.5)
LYMPHOCYTES # BLD: 1 K/UL (ref 0.9–3.6)
LYMPHOCYTES # BLD: 1.6 K/UL (ref 0.9–3.6)
LYMPHOCYTES NFR BLD: 13 % (ref 21–52)
LYMPHOCYTES NFR BLD: 19 % (ref 21–52)
MCH RBC QN AUTO: 29.7 PG (ref 24–34)
MCH RBC QN AUTO: 29.8 PG (ref 24–34)
MCHC RBC AUTO-ENTMCNC: 31.6 G/DL (ref 31–37)
MCHC RBC AUTO-ENTMCNC: 32 G/DL (ref 31–37)
MCV RBC AUTO: 92.8 FL (ref 78–100)
MCV RBC AUTO: 94.4 FL (ref 78–100)
MONOCYTES # BLD: 0.7 K/UL (ref 0.05–1.2)
MONOCYTES # BLD: 1.1 K/UL (ref 0.05–1.2)
MONOCYTES NFR BLD: 13 % (ref 3–10)
MONOCYTES NFR BLD: 9 % (ref 3–10)
NEUTS SEG # BLD: 5.4 K/UL (ref 1.8–8)
NEUTS SEG # BLD: 6.3 K/UL (ref 1.8–8)
NEUTS SEG NFR BLD: 64 % (ref 40–73)
NEUTS SEG NFR BLD: 76 % (ref 40–73)
NRBC # BLD: 0 K/UL (ref 0–0.01)
NRBC # BLD: 0 K/UL (ref 0–0.01)
NRBC BLD-RTO: 0 PER 100 WBC
NRBC BLD-RTO: 0 PER 100 WBC
NT PRO BNP: 719 PG/ML (ref 0–900)
PLATELET # BLD AUTO: 174 K/UL (ref 135–420)
PLATELET # BLD AUTO: 181 K/UL (ref 135–420)
PMV BLD AUTO: 10.4 FL (ref 9.2–11.8)
PMV BLD AUTO: 9.9 FL (ref 9.2–11.8)
POTASSIUM SERPL-SCNC: 3.4 MMOL/L (ref 3.5–5.5)
POTASSIUM SERPL-SCNC: 4.1 MMOL/L (ref 3.5–5.5)
PROT SERPL-MCNC: 7 G/DL (ref 6.4–8.2)
PROT SERPL-MCNC: 7.1 G/DL (ref 6.4–8.2)
RBC # BLD AUTO: 3.47 M/UL (ref 4.2–5.3)
RBC # BLD AUTO: 3.72 M/UL (ref 4.2–5.3)
SODIUM SERPL-SCNC: 139 MMOL/L (ref 136–145)
SODIUM SERPL-SCNC: 140 MMOL/L (ref 136–145)
TROPONIN I SERPL HS-MCNC: 10 NG/L (ref 0–54)
WBC # BLD AUTO: 8.3 K/UL (ref 4.6–13.2)
WBC # BLD AUTO: 8.4 K/UL (ref 4.6–13.2)

## 2024-11-15 PROCEDURE — 85379 FIBRIN DEGRADATION QUANT: CPT

## 2024-11-15 PROCEDURE — 85025 COMPLETE CBC W/AUTO DIFF WBC: CPT

## 2024-11-15 PROCEDURE — 83880 ASSAY OF NATRIURETIC PEPTIDE: CPT

## 2024-11-15 PROCEDURE — 80053 COMPREHEN METABOLIC PANEL: CPT

## 2024-11-15 PROCEDURE — 94761 N-INVAS EAR/PLS OXIMETRY MLT: CPT

## 2024-11-15 PROCEDURE — 99285 EMERGENCY DEPT VISIT HI MDM: CPT

## 2024-11-15 PROCEDURE — 6360000002 HC RX W HCPCS: Performed by: STUDENT IN AN ORGANIZED HEALTH CARE EDUCATION/TRAINING PROGRAM

## 2024-11-15 PROCEDURE — 94640 AIRWAY INHALATION TREATMENT: CPT

## 2024-11-15 PROCEDURE — 84484 ASSAY OF TROPONIN QUANT: CPT

## 2024-11-15 PROCEDURE — 99239 HOSP IP/OBS DSCHRG MGMT >30: CPT | Performed by: STUDENT IN AN ORGANIZED HEALTH CARE EDUCATION/TRAINING PROGRAM

## 2024-11-15 PROCEDURE — 6370000000 HC RX 637 (ALT 250 FOR IP): Performed by: STUDENT IN AN ORGANIZED HEALTH CARE EDUCATION/TRAINING PROGRAM

## 2024-11-15 PROCEDURE — 71045 X-RAY EXAM CHEST 1 VIEW: CPT

## 2024-11-15 PROCEDURE — 36415 COLL VENOUS BLD VENIPUNCTURE: CPT

## 2024-11-15 RX ADMIN — MEMANTINE 10 MG: 10 TABLET ORAL at 11:03

## 2024-11-15 RX ADMIN — ATORVASTATIN CALCIUM 10 MG: 10 TABLET, FILM COATED ORAL at 11:03

## 2024-11-15 RX ADMIN — HALOPERIDOL LACTATE 5 MG: 5 INJECTION, SOLUTION INTRAMUSCULAR at 04:40

## 2024-11-15 RX ADMIN — ARFORMOTEROL TARTRATE: 15 SOLUTION RESPIRATORY (INHALATION) at 10:56

## 2024-11-15 RX ADMIN — ENOXAPARIN SODIUM 30 MG: 100 INJECTION SUBCUTANEOUS at 11:03

## 2024-11-15 RX ADMIN — GABAPENTIN 100 MG: 100 CAPSULE ORAL at 11:03

## 2024-11-15 RX ADMIN — ATENOLOL 50 MG: 50 TABLET ORAL at 11:03

## 2024-11-15 RX ADMIN — POTASSIUM CHLORIDE 40 MEQ: 1500 TABLET, EXTENDED RELEASE ORAL at 11:03

## 2024-11-15 ASSESSMENT — PAIN SCALES - PAIN ASSESSMENT IN ADVANCED DEMENTIA (PAINAD)
CONSOLABILITY: NO NEED TO CONSOLE
BREATHING: NORMAL
TOTALSCORE: 0
CONSOLABILITY: NO NEED TO CONSOLE
TOTALSCORE: 0
TOTALSCORE: 0
BREATHING: NORMAL
BODYLANGUAGE: RELAXED
BODYLANGUAGE: RELAXED
FACIALEXPRESSION: SMILING OR INEXPRESSIVE
BODYLANGUAGE: RELAXED
TOTALSCORE: 0
FACIALEXPRESSION: SMILING OR INEXPRESSIVE
BODYLANGUAGE: RELAXED
TOTALSCORE: 0
FACIALEXPRESSION: SMILING OR INEXPRESSIVE
BREATHING: NORMAL
BODYLANGUAGE: RELAXED
TOTALSCORE: 0
BREATHING: NORMAL
CONSOLABILITY: NO NEED TO CONSOLE
CONSOLABILITY: NO NEED TO CONSOLE
FACIALEXPRESSION: SMILING OR INEXPRESSIVE
FACIALEXPRESSION: SMILING OR INEXPRESSIVE
CONSOLABILITY: NO NEED TO CONSOLE
BREATHING: NORMAL
BODYLANGUAGE: RELAXED
BREATHING: NORMAL
CONSOLABILITY: NO NEED TO CONSOLE
FACIALEXPRESSION: SMILING OR INEXPRESSIVE

## 2024-11-15 ASSESSMENT — PAIN SCALES - GENERAL
PAINLEVEL_OUTOF10: 0
PAINLEVEL_OUTOF10: 0

## 2024-11-15 ASSESSMENT — PAIN - FUNCTIONAL ASSESSMENT: PAIN_FUNCTIONAL_ASSESSMENT: NONE - DENIES PAIN

## 2024-11-15 NOTE — DISCHARGE INSTRUCTIONS
DISCHARGE SUMMARY from Nurse    PATIENT INSTRUCTIONS:    After general anesthesia or intravenous sedation, for 24 hours or while taking prescription Narcotics:  Limit your activities  Do not drive and operate hazardous machinery  Do not make important personal or business decisions  Do  not drink alcoholic beverages  If you have not urinated within 8 hours after discharge, please contact your surgeon on call.    Report the following to your surgeon:  Excessive pain, swelling, redness or odor of or around the surgical area  Temperature over 100.5  Nausea and vomiting lasting longer than 4 hours or if unable to take medications  Any signs of decreased circulation or nerve impairment to extremity: change in color, persistent  numbness, tingling, coldness or increase pain  Any questions    What to do at Home:  Recommended activity: activity as tolerated.    If you experience any of the following symptoms increased confusion, please follow up with your primary care provider or return to the ED.    *  Please give a list of your current medications to your Primary Care Provider.    *  Please update this list whenever your medications are discontinued, doses are      changed, or new medications (including over-the-counter products) are added.    *  Please carry medication information at all times in case of emergency situations.    These are general instructions for a healthy lifestyle:    No smoking/ No tobacco products/ Avoid exposure to second hand smoke  Surgeon General's Warning:  Quitting smoking now greatly reduces serious risk to your health.    Obesity, smoking, and sedentary lifestyle greatly increases your risk for illness    A healthy diet, regular physical exercise & weight monitoring are important for maintaining a healthy lifestyle    You may be retaining fluid if you have a history of heart failure or if you experience any of the following symptoms:  Weight gain of 3 pounds or more overnight or 5 pounds in

## 2024-11-15 NOTE — CARE COORDINATION
Requested Case Management specialist to assist with transportation to [facility].  Address is  [address] [place] [address], [place] [address]  and phone number is [phone]  Any steps at the residence? No  Patient will require BLS transport.   Pt requires Stretcher If stretcher, reason: Confused, AMS,dementai, BLE edema, mood disturbance  Patient is currently requiring oxygen No   Height:5'9\"   Weight: 252  Pt is on isolation: No Isolation is for:   Is the pt ready now? No, request 3pm  Requested time: 3 pm  PCS Faxed: No  Insurance verified on face sheet: Yes  Auth needed for transport: No  CM completed PCS/ Envelope and placed on chart.      Tiffani Pelaez BSN RN  Case Management  [phone]

## 2024-11-15 NOTE — CARE COORDINATION
Called the number for [facility] [phone] and spoke with Gloria to schedule BLS stretcher transport to [facility].  Address is  [address] [place] [address] and phone number is [phone]. The pickup is scheduled for 3 PM today.

## 2024-11-15 NOTE — PROGRESS NOTES
Discharge education and instructions reviewed with the POA Lugonda over the phone and Patricia at the bedside. All questions were answered.

## 2024-11-15 NOTE — DISCHARGE SUMMARY
Discharge Summary    Patient: Toya Durand MRN: [id number]  [facility]: [id number]    YOB: 1955  Age: 69 y.o.  Sex: female    DOA: 2024 LOS:  LOS: 3 days   Discharge Date: 11/15/24      Admission Diagnosis: Bradycardia [R00.1]  Altered mental status [R41.82]  Bilateral lower extremity edema [R60.0]  Mild dementia without behavioral disturbance, psychotic disturbance, mood disturbance, or anxiety, unspecified dementia type (HCC) [F03.A0]    Discharge Diagnosis:    Principal Problem:    Altered mental status  Active Problems:    Dementia (HCC)    Hypertension    Hypokalemia  Resolved Problems:    * No resolved hospital problems. *       Discharge Condition: Stable  Discharge Disposition: [facility]     PHYSICAL EXAM  Visit Vitals  /72   Pulse 65   Temp 97.9 °F (36.6 °C) (Axillary)   Resp 18   Ht 1.753 m (5' 9\")   Wt 114.3 kg (252 lb)   SpO2 98%   BMI 37.21 kg/m²     General appearance: Awake and alert. Will make eye contact and smile.   Lungs: clear to auscultation bilaterally  Heart: regular rate and rhythm, S1, S2 normal, no murmur, click, rub or gallop  Abdomen: soft. No grimace with palpation.   Pulses: 2+ and symmetric  Skin: Skin color, texture, turgor normal. No rashes or lesions  Neuro: No focal neurological deficits. Intermittently will speak but answers are not congruent with questions being asked. Cannot recite her name, . Does not answer questions concerning pain.     Hospital Course By Problem:     Acute encephalopathy -likely metabolic in nature.   #1. MRI completed on 24. CVA has been ruled out. Patient with chronic atrophy but no signs of acute change/CVA.   Urinary tract infection, prior to admission.  Patient has recently finished course of Keflex.  Urinalysis on  does not show signs of infection. Urine culture was without growth.   Dementia -patient on Seroquel, Namenda, Aricept at home.  This has been continued. Patient is severely

## 2024-11-15 NOTE — PLAN OF CARE
Problem: Discharge Planning  Goal: Discharge to home or other facility with appropriate resources  11/14/2024 2047 by Kylee Pop, RN  Outcome: Progressing  11/14/2024 0916 by Betsey Santoyo RN  Outcome: Progressing  Flowsheets (Taken 11/14/2024 0916)  Discharge to home or other facility with appropriate resources:   Identify barriers to discharge with patient and caregiver   Arrange for needed discharge resources and transportation as appropriate   Identify discharge learning needs (meds, wound care, etc)     Problem: Confusion  Goal: Confusion, delirium, dementia, or psychosis is improved or at baseline  Description: INTERVENTIONS:  1. Assess for possible contributors to thought disturbance, including medications, impaired vision or hearing, underlying metabolic abnormalities, dehydration, psychiatric diagnoses, and notify attending LIP  2. [place] high risk fall precautions, as indicated  3. Provide frequent short contacts to provide reality reorientation, refocusing and direction  4. Decrease environmental stimuli, including noise as appropriate  5. Monitor and intervene to maintain adequate nutrition, hydration, elimination, sleep and activity  6. If unable to ensure safety without constant attention obtain sitter and review sitter guidelines with assigned personnel  7. Initiate Psychosocial CNS and Spiritual Care consult, as indicated  11/14/2024 2047 by Kylee Pop, RN  Outcome: Progressing  11/14/2024 0916 by Betsey Santoyo RN  Outcome: Progressing  Flowsheets (Taken 11/14/2024 0111 by Sharee Thakkar, RN)  Effect of thought disturbance (confusion, delirium, dementia, or psychosis) are managed with adequate functional status:   Assess for contributors to thought disturbance, including medications, impaired vision or hearing, underlying metabolic abnormalities, dehydration, psychiatric diagnoses, notify LIP   [place] high risk fall precautions, as indicated   Provide frequent  short contacts to provide reality reorientation, refocusing and direction     Problem: Skin/Tissue Integrity  Goal: Absence of new skin breakdown  Description: 1.  Monitor for areas of redness and/or skin breakdown  2.  Assess vascular access sites hourly  3.  Every 4-6 hours minimum:  Change oxygen saturation probe site  4.  Every 4-6 hours:  If on nasal continuous positive airway pressure, respiratory therapy assess nares and determine need for appliance change or resting period.  11/14/2024 2047 by Kylee Pop RN  Outcome: Progressing  11/14/2024 0916 by Betsey Santoyo RN  Outcome: Progressing     Problem: Safety - Adult  Goal: Free from fall injury  11/14/2024 2047 by Kylee Pop RN  Outcome: Progressing  11/14/2024 0916 by Betsey Santoyo RN  Outcome: Progressing  Flowsheets (Taken 11/14/2024 0916)  Free From Fall Injury: Instruct family/caregiver on patient safety     Problem: Neurosensory - Adult  Goal: Achieves stable or improved neurological status  11/14/2024 2047 by Kylee Pop RN  Outcome: Progressing  11/14/2024 0916 by Betsey Santoyo RN  Outcome: Progressing  Flowsheets (Taken 11/14/2024 0916)  Achieves stable or improved neurological status:   Assess for and report changes in neurological status   Maintain blood pressure and fluid volume within ordered parameters to optimize cerebral perfusion and minimize risk of hemorrhage   Monitor temperature, glucose, and sodium. Initiate appropriate interventions as ordered     Problem: Cardiovascular - Adult  Goal: Absence of cardiac dysrhythmias or at baseline  11/14/2024 0916 by Betsey Santoyo RN  Outcome: Completed     Problem: Musculoskeletal - Adult  Goal: Return mobility to safest level of function  11/14/2024 2047 by Kylee Pop RN  Outcome: Progressing  11/14/2024 0916 by Betsey Santoyo RN  Outcome: Progressing  Flowsheets (Taken 11/14/2024 0737)  Return Mobility to Safest Level of Function:   Assess patient  Progressing     Problem: Genitourinary - Adult  Goal: Absence of urinary retention  11/14/2024 0916 by Betsey Santoyo RN  Outcome: Completed     Problem: Gastrointestinal - Adult  Goal: Maintains or returns to baseline bowel function  11/14/2024 0916 by Betsey Santoyo RN  Outcome: Completed  Flowsheets (Taken 11/14/2024 0111 by Sharee Thakkar RN)  Maintains or returns to baseline bowel function: Assess bowel function     Problem: Pain  Goal: Verbalizes/displays adequate comfort level or baseline comfort level  11/14/2024 0916 by Betsey Santoyo, RN  Outcome: Completed

## 2024-11-15 NOTE — CARE COORDINATION
11/15/24 1232   IMM Letter   IMM Letter given to Patient/Family/Significant other/Guardian/POA/by: Tiffani Pelaez   IMM Letter date given: 11/15/24   IMM Letter time given: 1052     Medicare pt, has received via email to pt DAVONTE Esquivel 2nd IMM letter informing them of their right to appeal the discharge. Signed copy has been placed in pt bedside chart.  Tiffani Pelaez BSN RN  Case Management  [phone]

## 2024-11-15 NOTE — PLAN OF CARE
Problem: Discharge Planning  Goal: Discharge to home or other facility with appropriate resources  Outcome: Progressing  Flowsheets (Taken 11/14/2024 0916)  Discharge to home or other facility with appropriate resources:   Identify barriers to discharge with patient and caregiver   Arrange for needed discharge resources and transportation as appropriate   Identify discharge learning needs (meds, wound care, etc)     Problem: Confusion  Goal: Confusion, delirium, dementia, or psychosis is improved or at baseline  Description: INTERVENTIONS:  1. Assess for possible contributors to thought disturbance, including medications, impaired vision or hearing, underlying metabolic abnormalities, dehydration, psychiatric diagnoses, and notify attending LIP  2. [place] high risk fall precautions, as indicated  3. Provide frequent short contacts to provide reality reorientation, refocusing and direction  4. Decrease environmental stimuli, including noise as appropriate  5. Monitor and intervene to maintain adequate nutrition, hydration, elimination, sleep and activity  6. If unable to ensure safety without constant attention obtain sitter and review sitter guidelines with assigned personnel  7. Initiate Psychosocial CNS and Spiritual Care consult, as indicated  Outcome: Progressing  Flowsheets (Taken 11/14/2024 0111 by Sharee Thakkar RN)  Effect of thought disturbance (confusion, delirium, dementia, or psychosis) are managed with adequate functional status:   Assess for contributors to thought disturbance, including medications, impaired vision or hearing, underlying metabolic abnormalities, dehydration, psychiatric diagnoses, notify LIP   [place] high risk fall precautions, as indicated   Provide frequent short contacts to provide reality reorientation, refocusing and direction     Problem: Skin/Tissue Integrity  Goal: Absence of new skin breakdown  Description: 1.  Monitor for areas of redness and/or skin  breakdown  2.  Assess vascular access sites hourly  3.  Every 4-6 hours minimum:  Change oxygen saturation probe site  4.  Every 4-6 hours:  If on nasal continuous positive airway pressure, respiratory therapy assess nares and determine need for appliance change or resting period.  Outcome: Progressing     Problem: Safety - Adult  Goal: Free from fall injury  Outcome: Progressing  Flowsheets (Taken 11/15/2024 1204)  Free From Fall Injury: Instruct family/caregiver on patient safety     Problem: Neurosensory - Adult  Goal: Achieves stable or improved neurological status  Outcome: Progressing  Flowsheets (Taken 11/15/2024 1204)  Achieves stable or improved neurological status:   Assess for and report changes in neurological status   Maintain blood pressure and fluid volume within ordered parameters to optimize cerebral perfusion and minimize risk of hemorrhage   Monitor temperature, glucose, and sodium. Initiate appropriate interventions as ordered     Problem: Musculoskeletal - Adult  Goal: Return mobility to safest level of function  Outcome: Completed  Flowsheets (Taken 11/14/2024 0737)  Return Mobility to Safest Level of Function:   Assess patient stability and activity tolerance for standing, transferring and ambulating with or without assistive devices   Assist with transfers and ambulation using safe patient handling equipment as needed   Ensure adequate protection for wounds/incisions during mobilization   Obtain physical therapy/occupational therapy consults as needed   Instruct patient/family in ordered activity level  Goal: Return ADL status to a safe level of function  Outcome: Completed

## 2024-11-15 NOTE — PROGRESS NOTES
Brief Update    MRI still not read. Will call [facility] after IDR and request that it be read again.    Cecily Cantu, DO

## 2024-11-15 NOTE — PROGRESS NOTES
Brief update    Pending MRI read.   Conditional discharge orders placed. Patient can go back to [facility] today after MRI is read.     Cecily Cantu, DO

## 2024-11-16 ENCOUNTER — APPOINTMENT (OUTPATIENT)
Facility: HOSPITAL | Age: 69
End: 2024-11-16
Payer: MEDICARE

## 2024-11-16 ENCOUNTER — APPOINTMENT (OUTPATIENT)
Facility: HOSPITAL | Age: 69
End: 2024-11-16
Attending: EMERGENCY MEDICINE
Payer: MEDICARE

## 2024-11-16 LAB
APPEARANCE UR: CLEAR
BACTERIA URNS QL MICRO: ABNORMAL /HPF
BILIRUB UR QL: NEGATIVE
COLOR UR: ABNORMAL
EKG ATRIAL RATE: 62 BPM
EKG DIAGNOSIS: NORMAL
EKG P AXIS: -26 DEGREES
EKG Q-T INTERVAL: 404 MS
EKG QRS DURATION: 86 MS
EKG QTC CALCULATION (BAZETT): 404 MS
EKG R AXIS: 53 DEGREES
EKG T AXIS: 35 DEGREES
EKG VENTRICULAR RATE: 60 BPM
EPITH CASTS URNS QL MICRO: ABNORMAL /LPF (ref 0–5)
GLUCOSE UR STRIP.AUTO-MCNC: NEGATIVE MG/DL
HGB UR QL STRIP: NEGATIVE
KETONES UR QL STRIP.AUTO: ABNORMAL MG/DL
LEUKOCYTE ESTERASE UR QL STRIP.AUTO: ABNORMAL
NITRITE UR QL STRIP.AUTO: NEGATIVE
PH UR STRIP: 6 (ref 5–8)
PROT UR STRIP-MCNC: ABNORMAL MG/DL
RBC #/AREA URNS HPF: NEGATIVE /HPF (ref 0–5)
SP GR UR REFRACTOMETRY: 1.03 (ref 1–1.03)
UROBILINOGEN UR QL STRIP.AUTO: 1 EU/DL (ref 0.2–1)
WBC URNS QL MICRO: ABNORMAL /HPF (ref 0–5)

## 2024-11-16 PROCEDURE — 81001 URINALYSIS AUTO W/SCOPE: CPT

## 2024-11-16 PROCEDURE — 93005 ELECTROCARDIOGRAM TRACING: CPT | Performed by: EMERGENCY MEDICINE

## 2024-11-16 PROCEDURE — 72170 X-RAY EXAM OF PELVIS: CPT

## 2024-11-16 PROCEDURE — 93010 ELECTROCARDIOGRAM REPORT: CPT | Performed by: INTERNAL MEDICINE

## 2024-11-16 PROCEDURE — 94761 N-INVAS EAR/PLS OXIMETRY MLT: CPT

## 2024-11-16 PROCEDURE — 73590 X-RAY EXAM OF LOWER LEG: CPT

## 2024-11-16 PROCEDURE — 93970 EXTREMITY STUDY: CPT

## 2024-11-16 NOTE — ED NOTES
Hourly rounding completed on pt  Pt in no apparent distress at this time, VSS  Pt on stretcher with eyes closed family at bedside

## 2024-11-16 NOTE — ED NOTES
Pt laying awake in stretcher eating food provided by family. Family at bedside. No distress noted att.

## 2024-11-16 NOTE — ED TRIAGE NOTES
Pt to ED via EMS from SNF, per staff, pt slid out of chair while they were changing her diaper. No LOC and no head injury.

## 2024-11-17 PROCEDURE — 97535 SELF CARE MNGMENT TRAINING: CPT

## 2024-11-17 PROCEDURE — 97166 OT EVAL MOD COMPLEX 45 MIN: CPT

## 2024-11-17 PROCEDURE — 97162 PT EVAL MOD COMPLEX 30 MIN: CPT

## 2024-11-17 PROCEDURE — 94761 N-INVAS EAR/PLS OXIMETRY MLT: CPT

## 2024-11-17 PROCEDURE — 97530 THERAPEUTIC ACTIVITIES: CPT

## 2024-11-17 RX ORDER — ATENOLOL 25 MG/1
50 TABLET ORAL DAILY
Status: DISCONTINUED | OUTPATIENT
Start: 2024-11-17 | End: 2024-11-18 | Stop reason: HOSPADM

## 2024-11-17 RX ORDER — MEMANTINE HYDROCHLORIDE 10 MG/1
10 TABLET ORAL 2 TIMES DAILY
Status: DISCONTINUED | OUTPATIENT
Start: 2024-11-17 | End: 2024-11-18 | Stop reason: HOSPADM

## 2024-11-17 RX ORDER — GABAPENTIN 100 MG/1
100 CAPSULE ORAL 2 TIMES DAILY
Status: DISCONTINUED | OUTPATIENT
Start: 2024-11-17 | End: 2024-11-18 | Stop reason: HOSPADM

## 2024-11-17 RX ORDER — ATORVASTATIN CALCIUM 10 MG/1
10 TABLET, FILM COATED ORAL NIGHTLY
Status: DISCONTINUED | OUTPATIENT
Start: 2024-11-17 | End: 2024-11-18 | Stop reason: HOSPADM

## 2024-11-17 RX ORDER — QUETIAPINE FUMARATE 25 MG/1
25 TABLET, FILM COATED ORAL ONCE
Status: DISCONTINUED | OUTPATIENT
Start: 2024-11-17 | End: 2024-11-18 | Stop reason: HOSPADM

## 2024-11-17 RX ORDER — LOSARTAN POTASSIUM 50 MG/1
100 TABLET ORAL DAILY
Status: DISCONTINUED | OUTPATIENT
Start: 2024-11-17 | End: 2024-11-18 | Stop reason: HOSPADM

## 2024-11-17 RX ORDER — DONEPEZIL HYDROCHLORIDE 5 MG/1
10 TABLET, FILM COATED ORAL NIGHTLY
Status: DISCONTINUED | OUTPATIENT
Start: 2024-11-17 | End: 2024-11-18 | Stop reason: HOSPADM

## 2024-11-17 ASSESSMENT — PAIN - FUNCTIONAL ASSESSMENT: PAIN_FUNCTIONAL_ASSESSMENT: NONE - DENIES PAIN

## 2024-11-17 NOTE — CARE COORDINATION
SNF referrals sent in [facility]. Dr Barnes is not admitting this patient as there is no medical need at this time. Advised, likely will not be accepted as CMS requires a three night stay for Medicare patients going to SNF. Admissions offices are closed at this time. Open Monday at 0800.

## 2024-11-17 NOTE — ED NOTES
6:40 PM :Pt care assumed from Dr. Hendricks , ED provider. Pt complaint(s), current treatment plan, progression and available diagnostic results have been discussed thoroughly. The patient was seen and evaluated on my shift.   Rounding occurred: Yes  Intended Disposition: pending  Pending diagnostic reports and/or labs (please list): Case management consult,PT/OT      Patient with history of dementia hypertension brought in for weakness.  She was transferred to this emergency department from [facility].    Dr. Hendricks  reports that the patient was able to ambulate on Tuesday but not able to ambulate today she was also complaining of severe neck pain    XR PELVIS (1-2 VIEWS)   Final Result      1.  Nothing definitely acute on limited one view of the pelvis. If there is high   clinical suspicion for hip fracture, CT could be performed.      Electronically signed by Alysia Reynaga      Vascular duplex lower extremity venous bilateral         XR TIBIA FIBULA LEFT (2 VIEWS)   Final Result      1.  Diffuse soft tissue swelling.   2.  No evidence of fracture      Electronically signed by Alysia Reynaga      XR CHEST PORTABLE   Final Result   1.  No acute infiltrate or effusion.       Electronically signed by J Carlos Cuevas          Doppler of Lower extremity - no evidence of DVT.    6:39 AM : Pt care transferred to Dr. Barnes  ,ED provider. History of patient complaint(s), available diagnostic reports and current treatment plan has been discussed thoroughly.   Bedside rounding on patient occured : yes .  Intended disposition of patient : Pending  Pending diagnostics reports and/or labs (please list): PT/OT, Case Management      Diagnosis:   1. Fall from bed, initial encounter    2. Left leg pain    3. Inability to walk          Disposition:    DISPOSITION Ed Observation 11/16/2024 10:10:59 AM         [facility]     [unfilled]            Tres Junior DO  11/17/24 0640

## 2024-11-17 NOTE — PLAN OF CARE
Problem: Occupational Therapy - Adult  Goal: By Discharge: Performs self-care activities at highest level of function for planned discharge setting.  See evaluation for individualized goals.  Description: Occupational Therapy Goals:  Initiated 11/17/2024 to be met within 7-10 days.    1.  Patient will perform self-feeding with minimal assistance and verbal cues.   2.  Patient will perform grooming with minimal assistance and verbal cues.  3.  Patient will perform bed mobility with minimal assistance in preparation for seated ADLs.   4.  Patient will sit EOB for 2 minutes with moderate assistance in preparation for seated ADLs.   5.  Patient will follow one step commands at least 50% of the time to increase safety and independence with ADLs.     PLOF: Pt resides in [facility] memory care unit. Per god dtr, up unil previous admission last week pt was requiring some assist w/ ADLs and ambulated independently.     Outcome: Progressing     OCCUPATIONAL THERAPY EVALUATION    Patient: Toya Durand (69 y.o. female)  Date: 11/17/2024  Primary Diagnosis: No admission diagnoses are documented for this encounter.       Precautions: Fall Risk, General Precautions, Skin    ASSESSMENT :  Pt was seen 11/14/24 for OT evaluation, OT/PT recommended SNF however pt discharged back to [facility], and was readmitted back to [facility] same day.     Pt seen in ED w/ PT to maximize pt's safety and participation. Pt's god dtr present in room and provides PLOF. Pt disoriented x4, following minimal commands. Some of pt's speech is understandable but some is unintelligible and jibberish.  Pt required total A to roll L>< R for therapists to change chux pad underneath pt as it was soiled. Pt would benefit from SNF vs LTC at d/c. Pt to be put on 3 day trial for OT to assess pt's level of participation in OT in the acute sestting. Pt left semi supine in bed, all needs in reach, god dtr present in room.     DEFICITS/IMPAIRMENTS:  Performance deficits / 
Cognition  Education Outcome: Unable to verbalize;Unable to demonstrate understanding    Thank you for this referral.  Tye Miller, PT  Minutes: 29      Eval Complexity: Decision Making: Medium Complexity

## 2024-11-17 NOTE — ED NOTES
Bedside and Verbal shift change report given to PAULINO Serarno (oncoming nurse) by PAULINO Gifford (offgoing nurse). Report included the following information Nurse Handoff Report, Index, ED Encounter Summary, ED SBAR, Adult Overview, Intake/Output, MAR, Recent Results, Quality Measures, Neuro Assessment, and Event Log.

## 2024-11-17 NOTE — ED NOTES
Patient moved to ER15 with family member bedside. Vitals updated. Patient resting during the entire encounter.

## 2024-11-17 NOTE — ED NOTES
Family at bedside. PT repositioned in bed for breakfast. PT stating \"I need to get up out of here. They're trying to take me to the hospital. I need to get on out and go home\". PT with pmhx of dementia. PT pleasantly confused at this time. PT at bedside.

## 2024-11-17 NOTE — ED NOTES
Bedside and Verbal shift change report given to kt (oncoming nurse) by franca (offgoing nurse). Report included the following information Nurse Handoff Report, ED SBAR, Adult Overview, and Recent Results.

## 2024-11-18 VITALS
HEART RATE: 76 BPM | OXYGEN SATURATION: 98 % | DIASTOLIC BLOOD PRESSURE: 70 MMHG | SYSTOLIC BLOOD PRESSURE: 131 MMHG | RESPIRATION RATE: 14 BRPM | TEMPERATURE: 98.5 F | WEIGHT: 256 LBS | HEIGHT: 69 IN | BODY MASS INDEX: 37.92 KG/M2

## 2024-11-18 LAB — ECHO BSA: 2.38 M2

## 2024-11-18 PROCEDURE — 93970 EXTREMITY STUDY: CPT | Performed by: SURGERY

## 2024-11-18 PROCEDURE — 6370000000 HC RX 637 (ALT 250 FOR IP): Performed by: STUDENT IN AN ORGANIZED HEALTH CARE EDUCATION/TRAINING PROGRAM

## 2024-11-18 RX ORDER — GABAPENTIN 100 MG/1
100 CAPSULE ORAL 2 TIMES DAILY
Qty: 60 CAPSULE | Refills: 0 | Status: SHIPPED | OUTPATIENT
Start: 2024-11-18 | End: 2024-12-18

## 2024-11-18 RX ADMIN — MEMANTINE HYDROCHLORIDE 10 MG: 10 TABLET ORAL at 10:24

## 2024-11-18 RX ADMIN — ATENOLOL 50 MG: 25 TABLET ORAL at 10:24

## 2024-11-18 RX ADMIN — GABAPENTIN 100 MG: 100 CAPSULE ORAL at 10:24

## 2024-11-18 RX ADMIN — LOSARTAN POTASSIUM 100 MG: 50 TABLET, FILM COATED ORAL at 10:24

## 2024-11-18 NOTE — ED PROVIDER NOTES
ED Course as of 11/18/24 1052   Sun Nov 17, 2024   0606 Cc to ks 68 yo female htn, encephalopathy, dementia  cc- generalized weakness/ workup - negative/ plan - case management / pt and ot /  [KS]   Mon Nov 18, 2024   0638 DV to JACKELYN 69-year-old female here for generalized weakness, case management consulted for possible placement [JG]   1052 Per Tiffani patient accepted to [facility] and rehab and discharge is placed [J]      ED Course User Index  [J] Jannet Killian DO  [KS] Amador Barnes MD Gillman, Jessica, DO  11/18/24 1052    
ED:  Medications - No data to display    ED Course as of 11/21/24 2023   Sun Nov 17, 2024   0606 Cc to ks 70 yo female htn, encephalopathy, dementia  cc- generalized weakness/ workup - negative/ plan - case management / pt and ot /  [KS]   Mon Nov 18, 2024   0638 DV to JG 69-year-old female here for generalized weakness, case management consulted for possible placement [JG]   1052 Per Tiffani patient accepted to [facility] and rehab and discharge is placed [JG]      ED Course User Index  [JG] Jannet Killian DO  [KS] Amador Barnes MD       Medical Decision Making     CC/HPI Summary, DDx, ED Course, and Reassessment: The patient is a 69-year-old woman with past medical history significant for hypertension and dementia, who was brought to the ED today by EMS from her memory care assisted living unit after sliding off the bed while she was being changed.  She was able to ambulate on Tuesday and she is not able to ambulate today.  She is complaining of severe left leg pain.  On exam she does have tenderness to palpation.  She does also have an elevated D-dimer.  Her EKG shows normal sinus rhythm.  Her chest x-ray is negative.      She has a UA and PVLs pending at this time.  The patient will remain in the ED overnight until she gets her vascular studies done    Disposition Considerations (tests considered but not done, Admit vs D/C, Shared Decision Making, Pt Expectation of Test or Tx.):  0      Critical Care Time:     Critical Care Procedure Note  Authorized and Performed by: Rosetta Harrington MD  Total critical care time: 36 minutes  Due to a high probability of clinically significant, life threatening deterioration, the patient required my highest level of preparedness to intervene emergently and I personally spent this critical care time directly and personally managing the patient. This critical care time included obtaining a history; examining the patient; pulse oximetry; ordering and review of studies;

## 2024-11-18 NOTE — CARE COORDINATION
CM called AMILCAR Tellez [phone], she is interested in pt going to [facility] and Rehab. Called liaison Kathy [phone], left voicemail to call CM back.    Addendum [phone]  Received a call back from Kathy, she will review clinicals and call back.    Addendum 1048  Pt has been accepted at [facility], pt AMILCAR miss Tellez made aware, Dr. Killian, charge nurse Angelique made aware. ED  to make transport arrangement.    Tiffani Pelaez BSN RN  Case Management  [phone]

## 2024-11-22 NOTE — PROGRESS NOTES
Physician Progress Note      PATIENT:               MERRILL CORONADO  [facility] #:                  [id number]  :                       1955  ADMIT DATE:       2024 5:13 PM  DISCH DATE:        11/15/2024 3:19 PM  RESPONDING  PROVIDER #:        Cecily Cantu DO          QUERY TEXT:    Dr. Cantu:    Patient admitted with AMS. Noted documentation of \"Acute encephalopathy   -likely metabolic in nature.\" per IM progress note dated . In order to   support the diagnosis of \"Metabolic encephalopathy\", please include additional   clinical indicators in your documentation.  Or please document if the   diagnosis of \"Metabolic encephalopathy\" has been ruled out after further   study.    The medical record reflects the following:  Risk Factors: Hx: Dementia; C/o AMS  Clinical Indicators: CT Head: Neg; MRI Brain; Na+: 140; K+: 3.0; Bun/Cr:   32/1.28; Gluc: 87; pBNP: 536; Trop. hs: 21; Ammonia: 27; Choles: 137; LDL:   72.6; Tri; AST: 107; UA: bact: 2+; UCx: NG;     AP: \"#Altered mental status. Unclear etiology. Progressive dementia vs   CVA. CT head shows hypodensities\".  ....Noted per AP: \"Neurological: Patient is alert.  (AAO to self)\".    11.15 DC Summary: \"1. Acute encephalopathy -likely metabolic in nature.  #1. MRI completed on 24. CVA has been ruled out. Patient with chronic   atrophy but no signs of acute change/CVA.  2. Urinary tract infection, prior to admission.  Patient has recently finished   course of Keflex.  Urinalysis on  does not show signs of   infection. Urine culture was without growth.  3. Dementia -patient on Seroquel, Namenda, Aricept at home.  This has been   continued. Patient is severely demented and continues to be a full code. Long   goals of care held with both mPOA\"  ......Noted per DC Summary: \"Neuro: No focal neurological deficits.   Intermittently will speak but answers are not congruent with questions being   asked. Cannot recite her name, . Does not

## 2024-12-21 ENCOUNTER — HOSPITAL ENCOUNTER (INPATIENT)
Facility: HOSPITAL | Age: 69
LOS: 1 days | Discharge: HOSPICE | DRG: 640 | End: 2024-12-23
Attending: EMERGENCY MEDICINE | Admitting: HOSPITALIST
Payer: MEDICARE

## 2024-12-21 DIAGNOSIS — Z51.5 HOSPICE CARE PATIENT: ICD-10-CM

## 2024-12-21 DIAGNOSIS — E87.0 HYPERNATREMIA: Primary | ICD-10-CM

## 2024-12-21 DIAGNOSIS — N17.9 AKI (ACUTE KIDNEY INJURY) (HCC): ICD-10-CM

## 2024-12-21 DIAGNOSIS — R41.82 ALTERED MENTAL STATUS, UNSPECIFIED ALTERED MENTAL STATUS TYPE: ICD-10-CM

## 2024-12-21 PROCEDURE — 99285 EMERGENCY DEPT VISIT HI MDM: CPT

## 2024-12-21 RX ORDER — SODIUM CHLORIDE, SODIUM LACTATE, POTASSIUM CHLORIDE, AND CALCIUM CHLORIDE .6; .31; .03; .02 G/100ML; G/100ML; G/100ML; G/100ML
1000 INJECTION, SOLUTION INTRAVENOUS ONCE
Status: DISCONTINUED | OUTPATIENT
Start: 2024-12-22 | End: 2024-12-22

## 2024-12-22 ENCOUNTER — APPOINTMENT (OUTPATIENT)
Facility: HOSPITAL | Age: 69
DRG: 640 | End: 2024-12-22
Payer: MEDICARE

## 2024-12-22 PROBLEM — E87.0 HYPERNATREMIA: Status: ACTIVE | Noted: 2024-12-22

## 2024-12-22 LAB
ALBUMIN SERPL-MCNC: 3.3 G/DL (ref 3.4–5)
ALBUMIN/GLOB SERPL: 0.7 (ref 0.8–1.7)
ALP SERPL-CCNC: 98 U/L (ref 45–117)
ALT SERPL-CCNC: 29 U/L (ref 13–56)
AMMONIA PLAS-SCNC: <10 UMOL/L (ref 11–32)
ANION GAP SERPL CALC-SCNC: 0 MMOL/L (ref 3–18)
ANION GAP SERPL CALC-SCNC: 2 MMOL/L (ref 3–18)
ANION GAP SERPL CALC-SCNC: 4 MMOL/L (ref 3–18)
ANION GAP SERPL CALC-SCNC: 4 MMOL/L (ref 3–18)
APPEARANCE UR: CLEAR
ARTERIAL PATENCY WRIST A: ABNORMAL
AST SERPL-CCNC: 27 U/L (ref 10–38)
BACTERIA URNS QL MICRO: ABNORMAL /HPF
BASE EXCESS BLD CALC-SCNC: 2.6 MMOL/L
BASOPHILS # BLD: 0.1 K/UL (ref 0–0.1)
BASOPHILS # BLD: 0.1 K/UL (ref 0–0.1)
BASOPHILS NFR BLD: 1 % (ref 0–2)
BASOPHILS NFR BLD: 1 % (ref 0–2)
BDY SITE: ABNORMAL
BILIRUB DIRECT SERPL-MCNC: 0.2 MG/DL (ref 0–0.2)
BILIRUB SERPL-MCNC: 0.8 MG/DL (ref 0.2–1)
BILIRUB UR QL: NEGATIVE
BODY TEMPERATURE: 97.6
BUN SERPL-MCNC: 92 MG/DL (ref 7–18)
BUN SERPL-MCNC: 98 MG/DL (ref 7–18)
BUN SERPL-MCNC: 99 MG/DL (ref 7–18)
BUN SERPL-MCNC: 99 MG/DL (ref 7–18)
BUN/CREAT SERPL: 29 (ref 12–20)
BUN/CREAT SERPL: 30 (ref 12–20)
CALCIUM SERPL-MCNC: 9.4 MG/DL (ref 8.5–10.1)
CALCIUM SERPL-MCNC: 9.7 MG/DL (ref 8.5–10.1)
CALCIUM SERPL-MCNC: 9.8 MG/DL (ref 8.5–10.1)
CALCIUM SERPL-MCNC: 9.9 MG/DL (ref 8.5–10.1)
CHLORIDE SERPL-SCNC: 127 MMOL/L (ref 100–111)
CHLORIDE SERPL-SCNC: 129 MMOL/L (ref 100–111)
CHLORIDE SERPL-SCNC: 130 MMOL/L (ref 100–111)
CHLORIDE SERPL-SCNC: 131 MMOL/L (ref 100–111)
CO2 SERPL-SCNC: 24 MMOL/L (ref 21–32)
CO2 SERPL-SCNC: 26 MMOL/L (ref 21–32)
CO2 SERPL-SCNC: 27 MMOL/L (ref 21–32)
CO2 SERPL-SCNC: 31 MMOL/L (ref 21–32)
COLOR UR: YELLOW
CREAT SERPL-MCNC: 3.08 MG/DL (ref 0.6–1.3)
CREAT SERPL-MCNC: 3.29 MG/DL (ref 0.6–1.3)
CREAT SERPL-MCNC: 3.33 MG/DL (ref 0.6–1.3)
CREAT SERPL-MCNC: 3.42 MG/DL (ref 0.6–1.3)
DIFFERENTIAL METHOD BLD: ABNORMAL
DIFFERENTIAL METHOD BLD: ABNORMAL
EKG DIAGNOSIS: NORMAL
EKG Q-T INTERVAL: 356 MS
EKG QRS DURATION: 100 MS
EKG QTC CALCULATION (BAZETT): 418 MS
EKG R AXIS: 11 DEGREES
EKG T AXIS: 225 DEGREES
EKG VENTRICULAR RATE: 83 BPM
EOSINOPHIL # BLD: 0.2 K/UL (ref 0–0.4)
EOSINOPHIL # BLD: 0.2 K/UL (ref 0–0.4)
EOSINOPHIL NFR BLD: 2 % (ref 0–5)
EOSINOPHIL NFR BLD: 2 % (ref 0–5)
EPITH CASTS URNS QL MICRO: ABNORMAL /LPF (ref 0–5)
ERYTHROCYTE [DISTWIDTH] IN BLOOD BY AUTOMATED COUNT: 15.4 % (ref 11.6–14.5)
ERYTHROCYTE [DISTWIDTH] IN BLOOD BY AUTOMATED COUNT: 15.5 % (ref 11.6–14.5)
ETHANOL SERPL-MCNC: <3 MG/DL (ref 0–3)
FLUAV RNA SPEC QL NAA+PROBE: NOT DETECTED
FLUBV RNA SPEC QL NAA+PROBE: NOT DETECTED
FOLATE SERPL-MCNC: 11.7 NG/ML (ref 3.1–17.5)
GAS FLOW.O2 O2 DELIVERY SYS: ABNORMAL
GAS FLOW.O2 SETTING OXYMISER: 18 BPM
GLOBULIN SER CALC-MCNC: 5 G/DL (ref 2–4)
GLUCOSE SERPL-MCNC: 104 MG/DL (ref 74–99)
GLUCOSE SERPL-MCNC: 109 MG/DL (ref 74–99)
GLUCOSE SERPL-MCNC: 115 MG/DL (ref 74–99)
GLUCOSE SERPL-MCNC: 118 MG/DL (ref 74–99)
GLUCOSE UR STRIP.AUTO-MCNC: NEGATIVE MG/DL
HCO3 BLD-SCNC: 28 MMOL/L (ref 21–28)
HCT VFR BLD AUTO: 44.4 % (ref 35–45)
HCT VFR BLD AUTO: 45.4 % (ref 35–45)
HGB BLD-MCNC: 12.6 G/DL (ref 12–16)
HGB BLD-MCNC: 13.5 G/DL (ref 12–16)
HGB UR QL STRIP: NEGATIVE
HYALINE CASTS URNS QL MICRO: ABNORMAL /LPF (ref 0–2)
IMM GRANULOCYTES # BLD AUTO: 0 K/UL (ref 0–0.04)
IMM GRANULOCYTES # BLD AUTO: 0 K/UL (ref 0–0.04)
IMM GRANULOCYTES NFR BLD AUTO: 0 % (ref 0–0.5)
IMM GRANULOCYTES NFR BLD AUTO: 0 % (ref 0–0.5)
KETONES UR QL STRIP.AUTO: NEGATIVE MG/DL
LACTATE BLD-SCNC: 2.3 MMOL/L (ref 0.4–2)
LACTATE SERPL-SCNC: 1.6 MMOL/L (ref 0.4–2)
LACTATE SERPL-SCNC: 2.2 MMOL/L (ref 0.4–2)
LEUKOCYTE ESTERASE UR QL STRIP.AUTO: NEGATIVE
LIPASE SERPL-CCNC: 140 U/L (ref 13–75)
LYMPHOCYTES # BLD: 2.1 K/UL (ref 0.9–3.6)
LYMPHOCYTES # BLD: 2.7 K/UL (ref 0.9–3.6)
LYMPHOCYTES NFR BLD: 24 % (ref 21–52)
LYMPHOCYTES NFR BLD: 31 % (ref 21–52)
MAGNESIUM SERPL-MCNC: 2.4 MG/DL (ref 1.6–2.6)
MAGNESIUM SERPL-MCNC: 2.5 MG/DL (ref 1.6–2.6)
MCH RBC QN AUTO: 28.3 PG (ref 24–34)
MCH RBC QN AUTO: 29.3 PG (ref 24–34)
MCHC RBC AUTO-ENTMCNC: 28.4 G/DL (ref 31–37)
MCHC RBC AUTO-ENTMCNC: 29.7 G/DL (ref 31–37)
MCV RBC AUTO: 98.5 FL (ref 78–100)
MCV RBC AUTO: 99.6 FL (ref 78–100)
MONOCYTES # BLD: 0.7 K/UL (ref 0.05–1.2)
MONOCYTES # BLD: 0.9 K/UL (ref 0.05–1.2)
MONOCYTES NFR BLD: 11 % (ref 3–10)
MONOCYTES NFR BLD: 8 % (ref 3–10)
MUCOUS THREADS URNS QL MICRO: ABNORMAL /LPF
NEUTS SEG # BLD: 5.1 K/UL (ref 1.8–8)
NEUTS SEG # BLD: 5.2 K/UL (ref 1.8–8)
NEUTS SEG NFR BLD: 59 % (ref 40–73)
NEUTS SEG NFR BLD: 62 % (ref 40–73)
NITRITE UR QL STRIP.AUTO: NEGATIVE
NRBC # BLD: 0 K/UL (ref 0–0.01)
NRBC # BLD: 0.02 K/UL (ref 0–0.01)
NRBC BLD-RTO: 0 PER 100 WBC
NRBC BLD-RTO: 0.2 PER 100 WBC
NT PRO BNP: 158 PG/ML (ref 0–900)
PCO2 BLD: 44 MMHG (ref 35–48)
PH BLD: 7.41 (ref 7.35–7.45)
PH UR STRIP: 5 (ref 5–8)
PHOSPHATE SERPL-MCNC: 3.6 MG/DL (ref 2.5–4.9)
PHOSPHATE SERPL-MCNC: 4.2 MG/DL (ref 2.5–4.9)
PLATELET # BLD AUTO: 131 K/UL (ref 135–420)
PLATELET # BLD AUTO: 164 K/UL (ref 135–420)
PMV BLD AUTO: 11.6 FL (ref 9.2–11.8)
PMV BLD AUTO: 12 FL (ref 9.2–11.8)
PO2 BLD: 75 MMHG (ref 83–108)
POTASSIUM SERPL-SCNC: 3.6 MMOL/L (ref 3.5–5.5)
POTASSIUM SERPL-SCNC: 3.7 MMOL/L (ref 3.5–5.5)
POTASSIUM SERPL-SCNC: 3.9 MMOL/L (ref 3.5–5.5)
POTASSIUM SERPL-SCNC: 4 MMOL/L (ref 3.5–5.5)
PROT SERPL-MCNC: 8.3 G/DL (ref 6.4–8.2)
PROT UR STRIP-MCNC: 30 MG/DL
RBC # BLD AUTO: 4.46 M/UL (ref 4.2–5.3)
RBC # BLD AUTO: 4.61 M/UL (ref 4.2–5.3)
RBC #/AREA URNS HPF: ABNORMAL /HPF (ref 0–5)
RESPIRATORY RATE, POC: 18 (ref 5–40)
SAO2 % BLD: 95.3 % (ref 92–97)
SARS-COV-2 RNA RESP QL NAA+PROBE: NOT DETECTED
SERVICE CMNT-IMP: ABNORMAL
SODIUM SERPL-SCNC: 158 MMOL/L (ref 136–145)
SODIUM SERPL-SCNC: 161 MMOL/L (ref 136–145)
SOURCE: NORMAL
SP GR UR REFRACTOMETRY: 1.02 (ref 1–1.03)
SPECIMEN TYPE: ABNORMAL
T4 FREE SERPL-MCNC: 1.3 NG/DL (ref 0.7–1.5)
TROPONIN I SERPL HS-MCNC: 22 NG/L (ref 0–54)
TROPONIN I SERPL HS-MCNC: 23 NG/L (ref 0–54)
TSH SERPL DL<=0.05 MIU/L-ACNC: 0.64 UIU/ML (ref 0.36–3.74)
UROBILINOGEN UR QL STRIP.AUTO: 0.2 EU/DL (ref 0.2–1)
VIT B12 SERPL-MCNC: 838 PG/ML (ref 211–911)
WBC # BLD AUTO: 8.4 K/UL (ref 4.6–13.2)
WBC # BLD AUTO: 8.7 K/UL (ref 4.6–13.2)
WBC URNS QL MICRO: ABNORMAL /HPF (ref 0–4)

## 2024-12-22 PROCEDURE — 84439 ASSAY OF FREE THYROXINE: CPT

## 2024-12-22 PROCEDURE — 2580000003 HC RX 258: Performed by: PHYSICIAN ASSISTANT

## 2024-12-22 PROCEDURE — 36600 WITHDRAWAL OF ARTERIAL BLOOD: CPT

## 2024-12-22 PROCEDURE — 83605 ASSAY OF LACTIC ACID: CPT

## 2024-12-22 PROCEDURE — 87086 URINE CULTURE/COLONY COUNT: CPT

## 2024-12-22 PROCEDURE — 84100 ASSAY OF PHOSPHORUS: CPT

## 2024-12-22 PROCEDURE — 93005 ELECTROCARDIOGRAM TRACING: CPT | Performed by: PHYSICIAN ASSISTANT

## 2024-12-22 PROCEDURE — 82607 VITAMIN B-12: CPT

## 2024-12-22 PROCEDURE — 82140 ASSAY OF AMMONIA: CPT

## 2024-12-22 PROCEDURE — 97165 OT EVAL LOW COMPLEX 30 MIN: CPT

## 2024-12-22 PROCEDURE — 87636 SARSCOV2 & INF A&B AMP PRB: CPT

## 2024-12-22 PROCEDURE — 6360000002 HC RX W HCPCS: Performed by: HOSPITALIST

## 2024-12-22 PROCEDURE — 99497 ADVNCD CARE PLAN 30 MIN: CPT | Performed by: STUDENT IN AN ORGANIZED HEALTH CARE EDUCATION/TRAINING PROGRAM

## 2024-12-22 PROCEDURE — 82803 BLOOD GASES ANY COMBINATION: CPT

## 2024-12-22 PROCEDURE — 81001 URINALYSIS AUTO W/SCOPE: CPT

## 2024-12-22 PROCEDURE — 83735 ASSAY OF MAGNESIUM: CPT

## 2024-12-22 PROCEDURE — 94640 AIRWAY INHALATION TREATMENT: CPT

## 2024-12-22 PROCEDURE — 2580000003 HC RX 258: Performed by: STUDENT IN AN ORGANIZED HEALTH CARE EDUCATION/TRAINING PROGRAM

## 2024-12-22 PROCEDURE — 93010 ELECTROCARDIOGRAM REPORT: CPT | Performed by: INTERNAL MEDICINE

## 2024-12-22 PROCEDURE — 2580000003 HC RX 258: Performed by: INTERNAL MEDICINE

## 2024-12-22 PROCEDURE — 1100000003 HC PRIVATE W/ TELEMETRY

## 2024-12-22 PROCEDURE — 84484 ASSAY OF TROPONIN QUANT: CPT

## 2024-12-22 PROCEDURE — 6370000000 HC RX 637 (ALT 250 FOR IP): Performed by: HOSPITALIST

## 2024-12-22 PROCEDURE — 80048 BASIC METABOLIC PNL TOTAL CA: CPT

## 2024-12-22 PROCEDURE — 82746 ASSAY OF FOLIC ACID SERUM: CPT

## 2024-12-22 PROCEDURE — 70450 CT HEAD/BRAIN W/O DYE: CPT

## 2024-12-22 PROCEDURE — 84443 ASSAY THYROID STIM HORMONE: CPT

## 2024-12-22 PROCEDURE — 99223 1ST HOSP IP/OBS HIGH 75: CPT | Performed by: HOSPITALIST

## 2024-12-22 PROCEDURE — 83690 ASSAY OF LIPASE: CPT

## 2024-12-22 PROCEDURE — 82077 ASSAY SPEC XCP UR&BREATH IA: CPT

## 2024-12-22 PROCEDURE — 2500000003 HC RX 250 WO HCPCS: Performed by: HOSPITALIST

## 2024-12-22 PROCEDURE — 6360000002 HC RX W HCPCS: Performed by: PHYSICIAN ASSISTANT

## 2024-12-22 PROCEDURE — 2700000000 HC OXYGEN THERAPY PER DAY

## 2024-12-22 PROCEDURE — 6360000002 HC RX W HCPCS: Performed by: STUDENT IN AN ORGANIZED HEALTH CARE EDUCATION/TRAINING PROGRAM

## 2024-12-22 PROCEDURE — 94761 N-INVAS EAR/PLS OXIMETRY MLT: CPT

## 2024-12-22 PROCEDURE — 71045 X-RAY EXAM CHEST 1 VIEW: CPT

## 2024-12-22 PROCEDURE — 36415 COLL VENOUS BLD VENIPUNCTURE: CPT

## 2024-12-22 PROCEDURE — 85025 COMPLETE CBC W/AUTO DIFF WBC: CPT

## 2024-12-22 PROCEDURE — 71250 CT THORAX DX C-: CPT

## 2024-12-22 PROCEDURE — 80076 HEPATIC FUNCTION PANEL: CPT

## 2024-12-22 PROCEDURE — 83880 ASSAY OF NATRIURETIC PEPTIDE: CPT

## 2024-12-22 RX ORDER — QUETIAPINE FUMARATE 25 MG/1
25 TABLET, FILM COATED ORAL NIGHTLY
Status: ON HOLD | COMMUNITY
End: 2024-12-23 | Stop reason: HOSPADM

## 2024-12-22 RX ORDER — SENNOSIDES A AND B 8.6 MG/1
2 TABLET, FILM COATED ORAL DAILY
Status: DISCONTINUED | OUTPATIENT
Start: 2024-12-22 | End: 2024-12-23 | Stop reason: HOSPADM

## 2024-12-22 RX ORDER — NYSTATIN 100000 U/G
2 OINTMENT TOPICAL 2 TIMES DAILY
Status: ON HOLD | COMMUNITY
End: 2024-12-23 | Stop reason: HOSPADM

## 2024-12-22 RX ORDER — ONDANSETRON 4 MG/1
4 TABLET, ORALLY DISINTEGRATING ORAL EVERY 8 HOURS PRN
Status: DISCONTINUED | OUTPATIENT
Start: 2024-12-22 | End: 2024-12-23 | Stop reason: HOSPADM

## 2024-12-22 RX ORDER — HEPARIN SODIUM 5000 [USP'U]/ML
5000 INJECTION, SOLUTION INTRAVENOUS; SUBCUTANEOUS EVERY 8 HOURS SCHEDULED
Status: DISCONTINUED | OUTPATIENT
Start: 2024-12-22 | End: 2024-12-23

## 2024-12-22 RX ORDER — ACETAMINOPHEN 650 MG/1
650 SUPPOSITORY RECTAL EVERY 6 HOURS PRN
Status: DISCONTINUED | OUTPATIENT
Start: 2024-12-22 | End: 2024-12-23 | Stop reason: HOSPADM

## 2024-12-22 RX ORDER — DIVALPROEX SODIUM 125 MG/1
125 CAPSULE, COATED PELLETS ORAL 2 TIMES DAILY
Status: ON HOLD | COMMUNITY
End: 2024-12-23 | Stop reason: HOSPADM

## 2024-12-22 RX ORDER — HYDRALAZINE HYDROCHLORIDE 25 MG/1
25 TABLET, FILM COATED ORAL 2 TIMES DAILY
Status: ON HOLD | COMMUNITY
End: 2024-12-23 | Stop reason: HOSPADM

## 2024-12-22 RX ORDER — MORPHINE SULFATE 2 MG/ML
2 INJECTION, SOLUTION INTRAMUSCULAR; INTRAVENOUS
Status: ACTIVE | OUTPATIENT
Start: 2024-12-22 | End: 2024-12-23

## 2024-12-22 RX ORDER — ONDANSETRON 2 MG/ML
4 INJECTION INTRAMUSCULAR; INTRAVENOUS EVERY 6 HOURS PRN
Status: DISCONTINUED | OUTPATIENT
Start: 2024-12-22 | End: 2024-12-23 | Stop reason: HOSPADM

## 2024-12-22 RX ORDER — ATROPINE SULFATE 0.1 MG/ML
1 INJECTION INTRAVENOUS ONCE
Status: DISCONTINUED | OUTPATIENT
Start: 2024-12-22 | End: 2024-12-22 | Stop reason: SDUPTHER

## 2024-12-22 RX ORDER — SODIUM CHLORIDE 0.9 % (FLUSH) 0.9 %
5-40 SYRINGE (ML) INJECTION EVERY 12 HOURS SCHEDULED
Status: DISCONTINUED | OUTPATIENT
Start: 2024-12-22 | End: 2024-12-23 | Stop reason: HOSPADM

## 2024-12-22 RX ORDER — DEXTROSE MONOHYDRATE 100 MG/ML
INJECTION, SOLUTION INTRAVENOUS CONTINUOUS PRN
Status: DISCONTINUED | OUTPATIENT
Start: 2024-12-22 | End: 2024-12-22

## 2024-12-22 RX ORDER — ATROPINE SULFATE 0.1 MG/ML
1 INJECTION INTRAVENOUS EVERY 5 MIN PRN
Status: DISCONTINUED | OUTPATIENT
Start: 2024-12-22 | End: 2024-12-23

## 2024-12-22 RX ORDER — SODIUM CHLORIDE 0.9 % (FLUSH) 0.9 %
5-40 SYRINGE (ML) INJECTION PRN
Status: DISCONTINUED | OUTPATIENT
Start: 2024-12-22 | End: 2024-12-23 | Stop reason: HOSPADM

## 2024-12-22 RX ORDER — LANOLIN ALCOHOL/MO/W.PET/CERES
100 CREAM (GRAM) TOPICAL DAILY
Status: DISCONTINUED | OUTPATIENT
Start: 2024-12-22 | End: 2024-12-23 | Stop reason: HOSPADM

## 2024-12-22 RX ORDER — DEXTROSE MONOHYDRATE 50 MG/ML
INJECTION, SOLUTION INTRAVENOUS CONTINUOUS
Status: DISCONTINUED | OUTPATIENT
Start: 2024-12-22 | End: 2024-12-23

## 2024-12-22 RX ORDER — POLYETHYLENE GLYCOL 3350 17 G/17G
17 POWDER, FOR SOLUTION ORAL DAILY PRN
Status: DISCONTINUED | OUTPATIENT
Start: 2024-12-22 | End: 2024-12-22

## 2024-12-22 RX ORDER — ATROPINE SULFATE 0.1 MG/ML
1 INJECTION INTRAVENOUS ONCE
Status: COMPLETED | OUTPATIENT
Start: 2024-12-22 | End: 2024-12-22

## 2024-12-22 RX ORDER — ERGOCALCIFEROL 1.25 MG/1
50000 CAPSULE, LIQUID FILLED ORAL WEEKLY
Status: ON HOLD | COMMUNITY
End: 2024-12-23 | Stop reason: HOSPADM

## 2024-12-22 RX ORDER — M-VIT,TX,IRON,MINS/CALC/FOLIC 27MG-0.4MG
1 TABLET ORAL DAILY
Status: ON HOLD | COMMUNITY
End: 2024-12-23 | Stop reason: HOSPADM

## 2024-12-22 RX ORDER — ACETAMINOPHEN 325 MG/1
650 TABLET ORAL EVERY 6 HOURS PRN
Status: DISCONTINUED | OUTPATIENT
Start: 2024-12-22 | End: 2024-12-23 | Stop reason: HOSPADM

## 2024-12-22 RX ORDER — HALOPERIDOL 5 MG/ML
5 INJECTION INTRAMUSCULAR EVERY 12 HOURS PRN
Status: ON HOLD | COMMUNITY
End: 2024-12-23 | Stop reason: HOSPADM

## 2024-12-22 RX ORDER — POLYETHYLENE GLYCOL 3350 17 G/17G
17 POWDER, FOR SOLUTION ORAL DAILY
Status: DISCONTINUED | OUTPATIENT
Start: 2024-12-22 | End: 2024-12-23 | Stop reason: HOSPADM

## 2024-12-22 RX ORDER — SODIUM CHLORIDE 9 MG/ML
INJECTION, SOLUTION INTRAVENOUS PRN
Status: DISCONTINUED | OUTPATIENT
Start: 2024-12-22 | End: 2024-12-23

## 2024-12-22 RX ORDER — BISACODYL 10 MG
10 SUPPOSITORY, RECTAL RECTAL
Status: COMPLETED | OUTPATIENT
Start: 2024-12-22 | End: 2024-12-22

## 2024-12-22 RX ORDER — ATORVASTATIN CALCIUM 10 MG/1
10 TABLET, FILM COATED ORAL DAILY
Status: DISCONTINUED | OUTPATIENT
Start: 2024-12-22 | End: 2024-12-23 | Stop reason: HOSPADM

## 2024-12-22 RX ORDER — LORAZEPAM 2 MG/ML
1 INJECTION INTRAMUSCULAR EVERY 4 HOURS PRN
Status: DISCONTINUED | OUTPATIENT
Start: 2024-12-22 | End: 2024-12-23

## 2024-12-22 RX ADMIN — DEXTROSE MONOHYDRATE: 50 INJECTION, SOLUTION INTRAVENOUS at 10:49

## 2024-12-22 RX ADMIN — DEXTROSE MONOHYDRATE: 50 INJECTION, SOLUTION INTRAVENOUS at 22:16

## 2024-12-22 RX ADMIN — SODIUM CHLORIDE, PRESERVATIVE FREE 10 ML: 5 INJECTION INTRAVENOUS at 21:12

## 2024-12-22 RX ADMIN — PIPERACILLIN AND TAZOBACTAM 4500 MG: 4; .5 INJECTION, POWDER, FOR SOLUTION INTRAVENOUS at 17:04

## 2024-12-22 RX ADMIN — ARFORMOTEROL TARTRATE: 15 SOLUTION RESPIRATORY (INHALATION) at 19:44

## 2024-12-22 RX ADMIN — ATROPINE SULFATE 1 MG: 0.1 INJECTION INTRAVENOUS at 16:54

## 2024-12-22 RX ADMIN — SODIUM CHLORIDE, PRESERVATIVE FREE 5 ML: 5 INJECTION INTRAVENOUS at 09:03

## 2024-12-22 RX ADMIN — DEXTROSE MONOHYDRATE AND SODIUM CHLORIDE: 5; .45 INJECTION, SOLUTION INTRAVENOUS at 02:23

## 2024-12-22 RX ADMIN — BISACODYL 10 MG: 10 SUPPOSITORY RECTAL at 07:02

## 2024-12-22 RX ADMIN — ARFORMOTEROL TARTRATE: 15 SOLUTION RESPIRATORY (INHALATION) at 09:08

## 2024-12-22 RX ADMIN — HEPARIN SODIUM 5000 UNITS: 5000 INJECTION INTRAVENOUS; SUBCUTANEOUS at 14:47

## 2024-12-22 RX ADMIN — HEPARIN SODIUM 5000 UNITS: 5000 INJECTION INTRAVENOUS; SUBCUTANEOUS at 06:14

## 2024-12-22 ASSESSMENT — PAIN SCALES - PAIN ASSESSMENT IN ADVANCED DEMENTIA (PAINAD)
CONSOLABILITY: NO NEED TO CONSOLE
BREATHING: NORMAL
BODYLANGUAGE: RELAXED
FACIALEXPRESSION: SMILING OR INEXPRESSIVE
FACIALEXPRESSION: SMILING OR INEXPRESSIVE
BODYLANGUAGE: RELAXED
TOTALSCORE: 0
BODYLANGUAGE: RELAXED
BREATHING: NORMAL
FACIALEXPRESSION: SMILING OR INEXPRESSIVE
BREATHING: NORMAL
CONSOLABILITY: NO NEED TO CONSOLE
TOTALSCORE: 0
CONSOLABILITY: NO NEED TO CONSOLE
TOTALSCORE: 0
TOTALSCORE: 0
CONSOLABILITY: NO NEED TO CONSOLE
BODYLANGUAGE: RELAXED
FACIALEXPRESSION: SMILING OR INEXPRESSIVE
BREATHING: NORMAL

## 2024-12-22 ASSESSMENT — PAIN SCALES - WONG BAKER: WONGBAKER_NUMERICALRESPONSE: NO HURT

## 2024-12-22 NOTE — ED NOTES
TRANSFER - OUT REPORT:    Verbal report given to PAULINO Saucedo on Toya Durand  being transferred to [facility] for routine progression of patient care       Report consisted of patient's Situation, Background, Assessment and   Recommendations(SBAR).     Information from the following report(s) Nurse Handoff Report, ED Encounter Summary, ED SBAR, MAR, and Neuro Assessment was reviewed with the receiving nurse.    [place] Fall Assessment:    Presents to emergency department  because of falls (Syncope, seizure, or loss of consciousness): No  Age > 70: No  Altered Mental Status, Intoxication with alcohol or substance confusion (Disorientation, impaired judgment, poor safety awaremess, or inability to follow instructions): Yes  Impaired Mobility: Ambulates or transfers with assistive devices or assistance; Unable to ambulate or transer.: Yes  Nursing Judgement: Yes          Lines:   Peripheral IV 12/22/24 Right Antecubital (Active)   Site Assessment Clean, dry & intact 12/22/24 0027   Line Status Blood return noted 12/22/24 0027   Phlebitis Assessment No symptoms 12/22/24 0027   Infiltration Assessment 0 12/22/24 0027       Peripheral IV 12/22/24 Left Antecubital (Active)   Site Assessment Clean, dry & intact 12/22/24 0059   Line Status Blood return noted 12/22/24 0059   Line Care Cap changed 12/22/24 0059   Phlebitis Assessment No symptoms 12/22/24 0059   Infiltration Assessment 0 12/22/24 0059   Alcohol Cap Used No 12/22/24 0059   Dressing Status Clean, dry & intact 12/22/24 0059   Dressing Type Transparent 12/22/24 0059   Dressing Intervention New 12/22/24 0059        Opportunity for questions and clarification was provided.      Patient transported with:  Monitor and Registered Nurse

## 2024-12-22 NOTE — SIGNIFICANT EVENT
Discussed unstable vital signs with attending. HR 36, BP 81/34. Will order 1mg atropine and monitor response. Attending reaching out to POA to discuss guarded prognosis.

## 2024-12-22 NOTE — ED PROVIDER NOTES
K/uL    MPV 11.6 9.2 - 11.8 FL    Nucleated RBCs 0.2 (H) 0  WBC    nRBC 0.02 (H) 0.00 - 0.01 K/uL    Neutrophils % 59 40 - 73 %    Lymphocytes % 31 21 - 52 %    Monocytes % 8 3 - 10 %    Eosinophils % 2 0 - 5 %    Basophils % 1 0 - 2 %    Immature Granulocytes % 0 0.0 - 0.5 %    Neutrophils Absolute 5.1 1.8 - 8.0 K/UL    Lymphocytes Absolute 2.7 0.9 - 3.6 K/UL    Monocytes Absolute 0.7 0.05 - 1.2 K/UL    Eosinophils Absolute 0.2 0.0 - 0.4 K/UL    Basophils Absolute 0.1 0.0 - 0.1 K/UL    Immature Granulocytes Absolute 0.0 0.00 - 0.04 K/UL    Differential Type AUTOMATED     BMP    Collection Time: 12/22/24 12:15 AM   Result Value Ref Range    Sodium 158 (H) 136 - 145 mmol/L    Potassium 3.9 3.5 - 5.5 mmol/L    Chloride 127 (H) 100 - 111 mmol/L    CO2 31 21 - 32 mmol/L    Anion Gap 0 (L) 3.0 - 18 mmol/L    Glucose 104 (H) 74 - 99 mg/dL    BUN 99 (H) 7.0 - 18 MG/DL    Creatinine 3.42 (H) 0.6 - 1.3 MG/DL    BUN/Creatinine Ratio 29 (H) 12 - 20      Est, Glom Filt Rate 14 (L) >60 ml/min/1.73m2    Calcium 9.9 8.5 - 10.1 MG/DL   Hepatic Function Panel    Collection Time: 12/22/24 12:15 AM   Result Value Ref Range    Total Protein 8.3 (H) 6.4 - 8.2 g/dL    Albumin 3.3 (L) 3.4 - 5.0 g/dL    Globulin 5.0 (H) 2.0 - 4.0 g/dL    Albumin/Globulin Ratio 0.7 (L) 0.8 - 1.7      Total Bilirubin 0.8 0.2 - 1.0 MG/DL    Bilirubin, Direct 0.2 0.0 - 0.2 MG/DL    Alk Phosphatase 98 45 - 117 U/L    AST 27 10 - 38 U/L    ALT 29 13 - 56 U/L   Lipase    Collection Time: 12/22/24 12:15 AM   Result Value Ref Range    Lipase 140 (H) 13 - 75 U/L   Troponin    Collection Time: 12/22/24 12:15 AM   Result Value Ref Range    Troponin, High Sensitivity 23 0 - 54 ng/L   Brain Natriuretic Peptide    Collection Time: 12/22/24 12:15 AM   Result Value Ref Range    NT Pro- 0 - 900 PG/ML   Magnesium    Collection Time: 12/22/24 12:15 AM   Result Value Ref Range    Magnesium 2.5 1.6 - 2.6 mg/dL   Phosphorus    Collection Time: 12/22/24 12:15 AM

## 2024-12-22 NOTE — ACP (ADVANCE CARE PLANNING)
Discussed CODE STATUS with patient's POA at bedside.  She would like to be a DNR order reflected in the chart. DNR form signed. Questions were answered to the best of my ability.    Addendum: Discussed with mPOA, Lugonda, regarding bradycardia, advanced dementia, guarded prognosis and goals of care. POA would not like to pursue PEG tube placement or further interventions and would like to focus on comfort. Patient is comfort measures only. Hospice evaluation hopeful Monday. Will continue gentle D5 given NPO status and hypernatremia. Orders reflected in chart. Questions were answered to the best of my ability.

## 2024-12-22 NOTE — ED TRIAGE NOTES
Pt brought in by EMS from [facility] with AMS and abnormal labs. [facility] could not tell EMS what the pts normal mentation is or what abnormal labs there were.    BS 93 for EMS.

## 2024-12-22 NOTE — PLAN OF CARE
Problem: Discharge Planning  Goal: Discharge to home or other facility with appropriate resources  Outcome: Progressing  Flowsheets (Taken 12/22/2024 1534)  Discharge to home or other facility with appropriate resources: Identify barriers to discharge with patient and caregiver     Problem: Skin/Tissue Integrity  Goal: Absence of new skin breakdown  Description: 1.  Monitor for areas of redness and/or skin breakdown  2.  Assess vascular access sites hourly  3.  Every 4-6 hours minimum:  Change oxygen saturation probe site  4.  Every 4-6 hours:  If on nasal continuous positive airway pressure, respiratory therapy assess nares and determine need for appliance change or resting period.  Outcome: Progressing  Note: Encourage frequent position change; at least every two hours. Monitor for signs of skin breakdown      Problem: Safety - Adult  Goal: Free from fall injury  Outcome: Progressing  Flowsheets (Taken 12/22/2024 1534)  Free From Fall Injury: Instruct family/caregiver on patient safety     Problem: ABCDS Injury Assessment  Goal: Absence of physical injury  Outcome: Progressing  Flowsheets (Taken 12/22/2024 1534)  Absence of Physical Injury: Implement safety measures based on patient assessment     Problem: Pain  Goal: Verbalizes/displays adequate comfort level or baseline comfort level  Outcome: Progressing  Flowsheets (Taken 12/22/2024 1534)  Verbalizes/displays adequate comfort level or baseline comfort level: Encourage patient to monitor pain and request assistance      DISPLAY PLAN FREE TEXT

## 2024-12-22 NOTE — ED NOTES
Per [facility], pt usually confused but alert. Labs came back critical with BUN 94.2, sodium 161. Was admitted to [facility] for fall, AMS and encephalopathy.

## 2024-12-22 NOTE — H&P
Result Value Ref Range    Ammonia <10 (L) 11 - 32 UMOL/L       Procedures/imaging: see electronic medical records for all procedures/Xrays and details which were not copied into this note but were reviewed prior to creation of Plan        Assessment/Plan     1.  Hypernatremia.  Continue fluids.  Nephrology consulted per ED.  2.  Acute metabolic encephalopathy.  Ammonia, TFTs WNL.  Head CT nil acute.  UA no evidence of infection.  Correct electrolyte abnormalities.  Seroquel, Neurontin, Aricept, memantine held.  Relieve constipation.  ABG stat, concern for sleep apnea.    3.  JANET.  Hold Cozaar.  Hold Neurontin.  Hold HCTZ.  Nephrology consulted per ED resident.  4.  Lactic acidosis.  5.  Dyslipidemia continue statin.  6.  Bradycardia, concern for sleep apnea.  Please follow ABG.  Cardiology consult.  7.  Constipation.  Suppository now.  Bowel regimen.  8.  Dementia with behavioral disturbance.  Seroquel, Aricept, memantine held.  See #2.  Sitter requested.  Supportive care.  9.  Hypertension, with relative hypotension.  Hold atenolol, hydralazine, Cozaar, HCTZ.  63  10.  Obesity Body mass index is 38.01 kg/m².  11.  DVT prophylaxis  12.  Full code.  Admit to stepdown.  Consult palliative care.    Vann,Lugonda (Other Relative)  [phone] (Home Phone)       Time spent 84 minutes.    Ilsa Johnson MD  December 22, 2024

## 2024-12-23 VITALS
BODY MASS INDEX: 37.89 KG/M2 | OXYGEN SATURATION: 94 % | HEIGHT: 68 IN | SYSTOLIC BLOOD PRESSURE: 107 MMHG | TEMPERATURE: 98.2 F | DIASTOLIC BLOOD PRESSURE: 57 MMHG | HEART RATE: 60 BPM | WEIGHT: 250 LBS | RESPIRATION RATE: 18 BRPM

## 2024-12-23 PROBLEM — Z51.5 ENCOUNTER FOR PALLIATIVE CARE: Status: ACTIVE | Noted: 2024-12-23

## 2024-12-23 PROBLEM — Z71.89 GOALS OF CARE, COUNSELING/DISCUSSION: Status: ACTIVE | Noted: 2024-12-23

## 2024-12-23 LAB
BACTERIA SPEC CULT: NORMAL
BACTERIA SPEC CULT: NORMAL
SERVICE CMNT-IMP: NORMAL
SERVICE CMNT-IMP: NORMAL

## 2024-12-23 PROCEDURE — 94761 N-INVAS EAR/PLS OXIMETRY MLT: CPT

## 2024-12-23 PROCEDURE — 99222 1ST HOSP IP/OBS MODERATE 55: CPT

## 2024-12-23 PROCEDURE — 94640 AIRWAY INHALATION TREATMENT: CPT

## 2024-12-23 PROCEDURE — 99239 HOSP IP/OBS DSCHRG MGMT >30: CPT | Performed by: STUDENT IN AN ORGANIZED HEALTH CARE EDUCATION/TRAINING PROGRAM

## 2024-12-23 PROCEDURE — 6360000002 HC RX W HCPCS: Performed by: HOSPITALIST

## 2024-12-23 PROCEDURE — 2700000000 HC OXYGEN THERAPY PER DAY

## 2024-12-23 PROCEDURE — 2500000003 HC RX 250 WO HCPCS: Performed by: HOSPITALIST

## 2024-12-23 RX ORDER — MORPHINE SULFATE 20 MG/ML
5 SOLUTION ORAL
Status: DISCONTINUED | OUTPATIENT
Start: 2024-12-23 | End: 2024-12-23

## 2024-12-23 RX ORDER — MORPHINE SULFATE 100 MG/5ML
5 SOLUTION ORAL
Qty: 30 ML | Refills: 0 | Status: SHIPPED | OUTPATIENT
Start: 2024-12-23 | End: 2025-01-02

## 2024-12-23 RX ORDER — BISACODYL 10 MG
10 SUPPOSITORY, RECTAL RECTAL DAILY PRN
Status: DISCONTINUED | OUTPATIENT
Start: 2024-12-23 | End: 2024-12-23 | Stop reason: HOSPADM

## 2024-12-23 RX ORDER — MORPHINE SULFATE 20 MG/ML
5 SOLUTION ORAL EVERY 4 HOURS PRN
Status: DISCONTINUED | OUTPATIENT
Start: 2024-12-23 | End: 2024-12-23 | Stop reason: HOSPADM

## 2024-12-23 RX ORDER — LORAZEPAM 2 MG/ML
0.5 CONCENTRATE ORAL EVERY 4 HOURS PRN
Qty: 30 ML | Refills: 0 | Status: SHIPPED | OUTPATIENT
Start: 2024-12-23 | End: 2025-01-06

## 2024-12-23 RX ORDER — ACETAMINOPHEN 650 MG/1
650 SUPPOSITORY RECTAL EVERY 4 HOURS PRN
Qty: 10 SUPPOSITORY | Refills: 0 | Status: SHIPPED | OUTPATIENT
Start: 2024-12-23

## 2024-12-23 RX ORDER — LORAZEPAM 2 MG/ML
0.5 INJECTION INTRAMUSCULAR EVERY 4 HOURS PRN
Status: DISCONTINUED | OUTPATIENT
Start: 2024-12-23 | End: 2024-12-23 | Stop reason: HOSPADM

## 2024-12-23 RX ORDER — LORAZEPAM 2 MG/ML
1 CONCENTRATE ORAL EVERY 4 HOURS PRN
Status: DISCONTINUED | OUTPATIENT
Start: 2024-12-23 | End: 2024-12-23 | Stop reason: HOSPADM

## 2024-12-23 RX ORDER — BISACODYL 10 MG
10 SUPPOSITORY, RECTAL RECTAL DAILY PRN
Qty: 10 SUPPOSITORY | Refills: 0 | Status: SHIPPED | OUTPATIENT
Start: 2024-12-23 | End: 2025-01-22

## 2024-12-23 RX ADMIN — SODIUM CHLORIDE, PRESERVATIVE FREE 10 ML: 5 INJECTION INTRAVENOUS at 08:47

## 2024-12-23 RX ADMIN — ARFORMOTEROL TARTRATE: 15 SOLUTION RESPIRATORY (INHALATION) at 09:23

## 2024-12-23 ASSESSMENT — PAIN SCALES - PAIN ASSESSMENT IN ADVANCED DEMENTIA (PAINAD)
CONSOLABILITY: NO NEED TO CONSOLE
TOTALSCORE: 0
BODYLANGUAGE: RELAXED
FACIALEXPRESSION: SMILING OR INEXPRESSIVE
BREATHING: NORMAL

## 2024-12-23 NOTE — DISCHARGE INSTRUCTIONS
symptoms; do not wait until your next office visit.        The discharge information has been reviewed with the patient.  The patient verbalized understanding.  Discharge medications reviewed with the patient and appropriate educational materials and side effects teaching were provided.  ___________________________________________________________________________________________________________________________________

## 2024-12-23 NOTE — PLAN OF CARE
Problem: Skin/Tissue Integrity  Goal: Absence of new skin breakdown  Description: 1.  Monitor for areas of redness and/or skin breakdown  2.  Assess vascular access sites hourly  3.  Every 4-6 hours minimum:  Change oxygen saturation probe site  4.  Every 4-6 hours:  If on nasal continuous positive airway pressure, respiratory therapy assess nares and determine need for appliance change or resting period.  12/23/2024 0811 by Sharri Tanner, RN  Outcome: Progressing  Note: Patient will remain free of any new skin breakdown during length of stay. Turning and repositioning for comfort implemented.     Problem: Safety - Adult  Goal: Free from fall injury  12/23/2024 0811 by Sharri Tanner, RN  Outcome: Progressing  Flowsheets (Taken 12/23/2024 0807)  Free From Fall Injury:   Instruct family/caregiver on patient safety   Based on caregiver fall risk screen, instruct family/caregiver to ask for assistance with transferring infant if caregiver noted to have fall risk factors  Note: Patient will remain free of falls or injuries during length of stay. Safety interventions in place: non-skid socks, bed alarm on, and call light within reach.     Problem: Pain  Goal: Verbalizes/displays adequate comfort level or baseline comfort level  12/23/2024 0811 by Sharri Tanner, RN  Flowsheets (Taken 12/23/2024 0811)  Verbalizes/displays adequate comfort level or baseline comfort level:   Encourage patient to monitor pain and request assistance   Assess pain using appropriate pain scale   Administer analgesics based on type and severity of pain and evaluate response   Implement non-pharmacological measures as appropriate and evaluate response  Note: Patient will be assessed using appropriate scale for level of response. Patient scores 0 on advanced dementia scale but will continue to be reassessed.

## 2024-12-23 NOTE — CONSULTS
Palliative Medicine Initial Consult  Patient Name: Toya Durand  YOB: 1955  MRN: [id number]  Age: 69 y.o.  Gender: female    Date of Initial Consult: 12/22/2024  Date of Service: 12/23/2024  Time: 2:40 PM  Provider: BERNARDO Kumar  Hospital Day: 3  Admit Date: 12/21/2024  Referring Provider:        Reasons for Consultation:  Goals of Care    HISTORY OF PRESENT ILLNESS (HPI):   Toya Durand is a 69 y.o. female with a past medical history of dementia with behavioral disturbance, hypertension, hyperlipidemia, who was admitted on 12/21/2024 from rehab facility via EMS with a diagnosis of altered mental status. Per chart review the patient was sent to the hospital for a decline in mental status. Chest x-ray showed no acute pulmonary disease. CT head no acute intracranial abnormalities. CT abdomen pelvis notes subcutaneous emphysema along the right posterior lateral lower abdomen; noted with scar on physical exam. CT further notes fecal retention or mucosal mass in the left posterior lateral rectal wall, no bowel obstruction. Lab work revealed Na of 158. Upon admission the patient has been too lethargic to undergo a speech-language evaluation. On 12/22 patient had hypotension and worsening hypernatremia. Attending spoke with medical POA and decisions were made to consult hospice for comfort measures. Palliative care was consulted for goals of care.    12/23/2024 Patient was lying in bed, mumbling words not understandable, pulling at her oxygen tubing, on 2L NC. Did not respond to questions, brow furrowed, withdrew her feet to touch.No family at bedside.    PALLIATIVE DIAGNOSES:    Encounter for palliative care  Goals of care  Hypernatremia  Altered mental status  Dementia with behavioral disturbance    ASSESSMENT AND PLAN:   Goals of care, counseling/discussion    12/23/2024 Patient seen and examined along with Iris Taylor RN. She was resting in bed, disoriented, did not answer questions, 
Room #: 406      VANESSA: [id number]      Situation: Wound Care Consult    Background:    PMH:   Active Ambulatory Problems     Diagnosis Date Noted    Altered mental status 11/12/2024    Dementia (HCC) 11/13/2024    Hypertension 11/13/2024    Hypokalemia 11/13/2024     Resolved Ambulatory Problems     Diagnosis Date Noted    No Resolved Ambulatory Problems     No Additional Past Medical History        Guru Score: 12/23   BMI: Body mass index is 38.01 kg/m².   Preventive measures in place: limited layers, robles, Glide sheet, heel boots    Assessment:   Patient found reclined.  Patient is Awake and alert, Oriented to person only, and Restless.     Wound(s) Description:           Wound 11/14/24 Femoral Proximal;Right;Anterior Femoral fingernail scratches and excoriation (Active)   Number of days: 39       Wound 12/22/24 Buttocks Posterior MASD  and hypopigmentation (Active)   Wound Etiology Pressure Stage 2 12/22/24 0800   Dressing Status Clean;Dry;Intact 12/23/24 0845   Wound Cleansed Not Cleansed 12/22/24 0800   Wound Assessment Other (Comment) 12/22/24 0800   Drainage Amount None (dry) 12/22/24 0800   Odor None 12/22/24 0800   Lynnette-wound Assessment Hyperpigmented 12/22/24 0800   Number of days: 1       Wound 12/22/24 Thigh Left;Dorsal Hyperpigmentation/Boggy (Active)   Number of days: 1        Left heel callous      Right heel callous      Recommendations:    Wound care orders as follows:   Liquid skin barrier film daily and PRN incontinence episodes. Apply to rectum, starting at rectum/perineum then extending to buttocks, in single layer, allow to dry then may apply silicone. If using Micotin or Nystatin powder, apply over powder by patting then allow to dry.        Other orders: Silicone dressing to sacrum and bilateral heels.  Change every 3 days and prn soilage.      Supplies Used: na         Care discussed with primary nurse, Nela BOB.   Care turned over to nursing staff at this time.    Mirian STEPHENN, 
Systems:  General: Dementia    Data Review:    Labs: Results:       Chemistry Recent Labs     12/22/24  0015 12/22/24  0205 12/22/24  0520 12/22/24  1134   * 158* 158* 161*   K 3.9 3.7 4.0 3.6   * 129* 130* 131*   CO2 31 27 24 26   BUN 99* 99* 98* 92*   GLOB 5.0*  --   --   --       CBC w/Diff Recent Labs     12/22/24  0015 12/22/24  0520   WBC 8.7 8.4   RBC 4.61 4.46   HGB 13.5 12.6   HCT 45.4* 44.4    131*      Coagulation No results for input(s): \"INR\", \"APTT\" in the last 72 hours.    Invalid input(s): \"PTP\"    Iron/Ferritin No results for input(s): \"IRON\" in the last 72 hours.    Invalid input(s): \"TIBCCALC\"   BNP Invalid input(s): \"BNPP\"   Cardiac Enzymes No results for input(s): \"CPK\" in the last 72 hours.    Invalid input(s): \"CKRMB\", \"CKND1\", \"TROIP\", \"RHONDA\"   Liver Enzymes No results for input(s): \"TP\" in the last 72 hours.    Invalid input(s): \"ALB\", \"TBIL\", \"AP\", \"SGOT\", \"GPT\", \"DBIL\"   Thyroid Studies Lab Results   Component Value Date/Time    TSH 0.64 12/22/2024 12:15 AM             Physical Assessment:   Visit Vitals  BP (!) 104/46   Pulse (!) 47   Temp 98.2 °F (36.8 °C) (Axillary)   Resp 16   Ht 1.727 m (5' 8\")   Wt 113.4 kg (250 lb)   SpO2 100%   BMI 38.01 kg/m²       Intake/Output Summary (Last 24 hours) at 12/22/2024 1419  Last data filed at 12/22/2024 1211  Gross per 24 hour   Intake 0 ml   Output --   Net 0 ml     Physial Exam: looks dry  General appearance: contracted, nonverbal  Neurologic: can not assess  Neck: no JVD  Lungs: pradip air entry  Heart: S1S2  Abdomen: Soft, NT  Extremities: no edema    Impression and Plan:     JANET: place Le[place] to ascertain urine output.   Pre-renal Azotemia leading to ATN from volume depletion, Diuretic & Hypotension/Bradycardia. Continue IVF. No indication for RRT, neither pt a candidate with advanced Dementia at baseline.   HyperNa: Hypotonic IVF. D/w god-daughter [also PoA] that JANET/HyperNa likely to recur unless pt gets proper

## 2024-12-23 NOTE — PLAN OF CARE
Problem: Skin/Tissue Integrity  Goal: Absence of new skin breakdown  Description: 1.  Monitor for areas of redness and/or skin breakdown  2.  Assess vascular access sites hourly  3.  Every 4-6 hours minimum:  Change oxygen saturation probe site  4.  Every 4-6 hours:  If on nasal continuous positive airway pressure, respiratory therapy assess nares and determine need for appliance change or resting period.  Outcome: Progressing  Note: Patient will remain free of any new skin breakdown during length of stay. Turning and repositioning for comfort implemented.      Problem: Safety - Adult  Goal: Free from fall injury  Outcome: Progressing  Free From Fall Injury: Instruct family/caregiver on patient safety  Note: Patient will remain free of falls or injuries during length of stay. Safety interventions in place: non-skid socks, bed alarm on, and call light within reach.     Problem: Pain  Goal: Verbalizes/displays adequate comfort level or baseline comfort level  Outcome: Progressing  Flowsheets  Taken 12/23/2024 0657  Verbalizes/displays adequate comfort level or baseline comfort level: Assess pain using appropriate pain scale  Taken 12/22/2024 1931  Verbalizes/displays adequate comfort level or baseline comfort level: Assess pain using appropriate pain scale  Note: Patient will be assessed using appropriate scale for level of response. Patient scores 0 on advanced dementia scale but will continue to be reassessed.

## 2024-12-23 NOTE — ACP (ADVANCE CARE PLANNING)
Advance Care Planning     Palliative Team Advance Care Planning (ACP) Conversation    Date of Conversation: 12/23/24       ACP documents on file prior to discussion:  -Power of  for Healthcare  -Portable DNR    Healthcare Decision Maker:    Primary Decision Maker (Active): GeoffLugonda - Other Relative - [phone]    Secondary Decision Maker: Muriel Lizarraga - Friend - [phone]     Conversation Summary:    Visited patient for new consult along with Palliative team member SUSANNA Muhammad NP. Patient is resting comfortably in bed, eyes opened. Attempting to take her oxygen off. Mumbling and very difficult to understand. She is not able to participate in her health care conversations or make complex medical decisions.Noted in chart that patient was transitioned to comfort measures 12/22/24. Appreciate Dr. Quiroga's assistance.    Patient does have an Advanced Medical Directive on file naming Kerrigonda Vann as her primary medical decision maker, and Muriel Lizarraga as the secondary decision maker. There is also a signed DDNR on file.     Call placed to Lugonda at [phone]. Palliative team provided a medical update, discussed comfort measures including having the support of hospice at discharge.  She is in agreement with the treatment plan and hospice consult.    Palliative team remains available to provide support to Ms Durand and her family during this hospital stay.    Resuscitation Status:   Code Status: DNR     Documentation Completed:  -No new documents completed.    Iris Taylor RN  Palliative Medicine Inpatient RN  Palliative COPE Line: [phone]

## 2024-12-23 NOTE — CARE COORDINATION
RUDDY notified [facility] Admissions Director of the patient return. Transport was arranged through [facility] [phone],  time is: 6 pm      Pt will be transported back to [facility].      Please  meds form the outpatient pharmacy before Discharged.     Call report to: [phone]      CAROL Hendricks     [phone]

## 2024-12-23 NOTE — CARE COORDINATION
RUDDY spoke with Mrs. Tellez active decision maker. She is agreeable to the patient returning to [facility]\"" and the facility providing comfort measures.       Breanna Aragon, MSW     [phone]

## 2024-12-23 NOTE — DISCHARGE SUMMARY
ABDOMEN/PELVIS: Unenhanced liver, gallbladder, spleen, pancreas, adrenal glands unremarkable. Small hyperdense cyst in the right kidney. Small hypodense cyst in the right kidney. Left kidney unremarkable. No hydronephrosis or surrounding stranding. No hydroureter. No obstructing stones. Aorta is normal in caliber. No surrounding inflammation or adenopathy. No ascites or free air. Small hiatal hernia. Small bowel unremarkable. Colon with mild fecal retention in the rectosigmoid colon. Focal mucosal thickening in the left posterior rectal wall. No extraluminal inflammation. Atrophic uterus with small calcified granuloma. Bladder is not distended for optimal evaluation. Severe subcutaneous emphysema/air along the right posterolateral lower abdomen. No fluid collection. Advanced degenerative disease in the thoracolumbar spine and in the hips.     1. No acute thoracic abnormalities. 2. Severe subcutaneous emphysema/air along the right posterolateral lower abdomen. Trauma or necrotizing cellulitis. Clinical correlation? No fluid collection. 3. Fecal retention or mucosal mass in the left posterolateral rectal wall. Clinical correlation needed. No bowel obstruction or extraluminal inflammation. 4. Additional chronic findings as described. Electronically signed by Jamie Berry    CT HEAD WO CONTRAST    Result Date: 12/22/2024  CT HEAD UNENHANCED CPT code: 84658 INDICATION: Altered mental status TECHNIQUE: 5 mm collimation axial images obtained from the skull base through the vertex without administration of nonionic intravenous contrast.  All CT scans at this facility are performed using dose reduction optimization techniques as appropriate to a performed exam including the following: Automated exposure control, adjustments of the mA and or kV according to patient size, or use of reconstruction technique. COMPARISON: November 12, 2024 FINDINGS: Mild global volume loss with presumed small vessel ischemic disease of white
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
